# Patient Record
Sex: FEMALE | Race: WHITE | NOT HISPANIC OR LATINO | Employment: PART TIME | URBAN - METROPOLITAN AREA
[De-identification: names, ages, dates, MRNs, and addresses within clinical notes are randomized per-mention and may not be internally consistent; named-entity substitution may affect disease eponyms.]

---
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
## 2019-09-30 ENCOUNTER — TELEPHONE (OUTPATIENT)
Dept: FAMILY MEDICINE CLINIC | Facility: HOSPITAL | Age: 19
End: 2019-09-30

## 2019-11-12 NOTE — TELEPHONE ENCOUNTER
PATIENT MOTHER CAME IN AND STATED THAT DAUGHTER HAS MOVED OUT OF STATE, BUT WILL BE MOVING BACK TO THE AREA COME THE NEW YEAR - PLEASE KEEP US AS PCP AND PATIENT WILL CONTACT US TO SCHEDULE ONCE SHE IS BACK

## 2020-01-17 ENCOUNTER — OFFICE VISIT (OUTPATIENT)
Dept: FAMILY MEDICINE CLINIC | Facility: HOSPITAL | Age: 20
End: 2020-01-17
Payer: OTHER GOVERNMENT

## 2020-01-17 VITALS
TEMPERATURE: 97.5 F | DIASTOLIC BLOOD PRESSURE: 72 MMHG | BODY MASS INDEX: 27.76 KG/M2 | HEIGHT: 65 IN | HEART RATE: 75 BPM | SYSTOLIC BLOOD PRESSURE: 100 MMHG | OXYGEN SATURATION: 99 % | WEIGHT: 166.6 LBS

## 2020-01-17 DIAGNOSIS — Z00.00 ANNUAL PHYSICAL EXAM: Primary | ICD-10-CM

## 2020-01-17 DIAGNOSIS — Z13.220 SCREENING CHOLESTEROL LEVEL: ICD-10-CM

## 2020-01-17 DIAGNOSIS — Z23 ENCOUNTER FOR IMMUNIZATION: ICD-10-CM

## 2020-01-17 DIAGNOSIS — Z11.4 SCREENING FOR HIV (HUMAN IMMUNODEFICIENCY VIRUS): ICD-10-CM

## 2020-01-17 DIAGNOSIS — F41.8 DEPRESSION WITH ANXIETY: ICD-10-CM

## 2020-01-17 PROCEDURE — 90472 IMMUNIZATION ADMIN EACH ADD: CPT

## 2020-01-17 PROCEDURE — 99395 PREV VISIT EST AGE 18-39: CPT | Performed by: NURSE PRACTITIONER

## 2020-01-17 PROCEDURE — 90715 TDAP VACCINE 7 YRS/> IM: CPT

## 2020-01-17 PROCEDURE — 90682 RIV4 VACC RECOMBINANT DNA IM: CPT

## 2020-01-17 PROCEDURE — 90471 IMMUNIZATION ADMIN: CPT

## 2020-01-17 RX ORDER — BUPROPION HYDROCHLORIDE 75 MG/1
75 TABLET ORAL DAILY
COMMUNITY
End: 2020-01-17 | Stop reason: SDUPTHER

## 2020-01-17 RX ORDER — BUPROPION HYDROCHLORIDE 100 MG/1
100 TABLET ORAL DAILY
Qty: 30 TABLET | Refills: 2 | Status: SHIPPED | OUTPATIENT
Start: 2020-01-17 | End: 2020-04-21 | Stop reason: SDUPTHER

## 2020-01-17 NOTE — ASSESSMENT & PLAN NOTE
MP score elevated at 12 w/PHQ score of 9  Wellbutrin increased from 75 to 100mg daily per pt request  Continue lexapro 15mg daily and propranolol prn panic attacks  Return in 2-3 months for next med check  Call sooner w/any acute mood concerns

## 2020-01-17 NOTE — PATIENT INSTRUCTIONS
Wellness Visit for Adults   AMBULATORY CARE:   A wellness visit  is when you see your healthcare provider to get screened for health problems  You can also get advice on how to stay healthy  Write down your questions so you remember to ask them  Ask your healthcare provider how often you should have a wellness visit  What happens at a wellness visit:  Your healthcare provider will ask about your health, and your family history of health problems  This includes high blood pressure, heart disease, and cancer  He or she will ask if you have symptoms that concern you, if you smoke, and about your mood  You may also be asked about your intake of medicines, supplements, food, and alcohol  Any of the following may be done:  · Your weight  will be checked  Your height may also be checked so your body mass index (BMI) can be calculated  Your BMI shows if you are at a healthy weight  · Your blood pressure  and heart rate will be checked  Your temperature may also be checked  · Blood and urine tests  may be done  Blood tests may be done to check your cholesterol levels  Abnormal cholesterol levels increase your risk for heart disease and stroke  You may also need a blood or urine test to check for diabetes if you are at increased risk  Urine tests may be done to look for signs of an infection or kidney disease  · A physical exam  includes checking your heartbeat and lungs with a stethoscope  Your healthcare provider may also check your skin to look for sun damage  · Screening tests  may be recommended  A screening test is done to check for diseases that may not cause symptoms  The screening tests you may need depend on your age, gender, family history, and lifestyle habits  For example, colorectal screening may be recommended if you are 48years old or older  Screening tests you need if you are a woman:   · A Pap smear  is used to screen for cervical cancer   Pap smears are usually done every 3 to 5 years depending on your age  You may need them more often if you have had abnormal Pap smear test results in the past  Ask your healthcare provider how often you should have a Pap smear  · A mammogram  is an x-ray of your breasts to screen for breast cancer  Experts recommend mammograms every 2 years starting at age 48 years  You may need a mammogram at age 52 years or younger if you have an increased risk for breast cancer  Talk to your healthcare provider about when you should start having mammograms and how often you need them  Vaccines you may need:   · Get an influenza vaccine  every year  The influenza vaccine protects you from the flu  Several types of viruses cause the flu  The viruses change over time, so new vaccines are made each year  · Get a tetanus-diphtheria (Td) booster vaccine  every 10 years  This vaccine protects you against tetanus and diphtheria  Tetanus is a severe infection that may cause painful muscle spasms and lockjaw  Diphtheria is a severe bacterial infection that causes a thick covering in the back of your mouth and throat  · Get a human papillomavirus (HPV) vaccine  if you are female and aged 23 to 32 or male 23 to 24 and never received it  This vaccine protects you from HPV infection  HPV is the most common infection spread by sexual contact  HPV may also cause vaginal, penile, and anal cancers  · Get a pneumococcal vaccine  if you are aged 72 years or older  The pneumococcal vaccine is an injection given to protect you from pneumococcal disease  Pneumococcal disease is an infection caused by pneumococcal bacteria  The infection may cause pneumonia, meningitis, or an ear infection  · Get a shingles vaccine  if you are aged 61 or older, even if you have had shingles before  The shingles vaccine is an injection to protect you from the varicella-zoster virus  This is the same virus that causes chickenpox   Shingles is a painful rash that develops in people who had chickenpox or have been exposed to the virus  How to eat healthy:  My Plate is a model for planning healthy meals  It shows the types and amounts of foods that should go on your plate  Fruits and vegetables make up about half of your plate, and grains and protein make up the other half  A serving of dairy is included on the side of your plate  The amount of calories and serving sizes you need depends on your age, gender, weight, and height  Examples of healthy foods are listed below:  · Eat a variety of vegetables  such as dark green, red, and orange vegetables  You can also include canned vegetables low in sodium (salt) and frozen vegetables without added butter or sauces  · Eat a variety of fresh fruits , canned fruit in 100% juice, frozen fruit, and dried fruit  · Include whole grains  At least half of the grains you eat should be whole grains  Examples include whole-wheat bread, wheat pasta, brown rice, and whole-grain cereals such as oatmeal     · Eat a variety of protein foods such as seafood (fish and shellfish), lean meat, and poultry without skin (turkey and chicken)  Examples of lean meats include pork leg, shoulder, or tenderloin, and beef round, sirloin, tenderloin, and extra lean ground beef  Other protein foods include eggs and egg substitutes, beans, peas, soy products, nuts, and seeds  · Choose low-fat dairy products such as skim or 1% milk or low-fat yogurt, cheese, and cottage cheese  · Limit unhealthy fats  such as butter, hard margarine, and shortening  Exercise:  Exercise at least 30 minutes per day on most days of the week  Some examples of exercise include walking, biking, dancing, and swimming  You can also fit in more physical activity by taking the stairs instead of the elevator or parking farther away from stores  Include muscle strengthening activities 2 days each week  Regular exercise provides many health benefits   It helps you manage your weight, and decreases your risk for type 2 diabetes, heart disease, stroke, and high blood pressure  Exercise can also help improve your mood  Ask your healthcare provider about the best exercise plan for you  General health and safety guidelines:   · Do not smoke  Nicotine and other chemicals in cigarettes and cigars can cause lung damage  Ask your healthcare provider for information if you currently smoke and need help to quit  E-cigarettes or smokeless tobacco still contain nicotine  Talk to your healthcare provider before you use these products  · Limit alcohol  A drink of alcohol is 12 ounces of beer, 5 ounces of wine, or 1½ ounces of liquor  · Lose weight, if needed  Being overweight increases your risk of certain health conditions  These include heart disease, high blood pressure, type 2 diabetes, and certain types of cancer  · Protect your skin  Do not sunbathe or use tanning beds  Use sunscreen with a SPF 15 or higher  Apply sunscreen at least 15 minutes before you go outside  Reapply sunscreen every 2 hours  Wear protective clothing, hats, and sunglasses when you are outside  · Drive safely  Always wear your seatbelt  Make sure everyone in your car wears a seatbelt  A seatbelt can save your life if you are in an accident  Do not use your cell phone when you are driving  This could distract you and cause an accident  Pull over if you need to make a call or send a text message  · Practice safe sex  Use latex condoms if are sexually active and have more than one partner  Your healthcare provider may recommend screening tests for sexually transmitted infections (STIs)  · Wear helmets, lifejackets, and protective gear  Always wear a helmet when you ride a bike or motorcycle, go skiing, or play sports that could cause a head injury  Wear protective equipment when you play sports  Wear a lifejacket when you are on a boat or doing water sports    © 2017 [employer] Information is for End User's use only and may not be sold, redistributed or otherwise used for commercial purposes  All illustrations and images included in CareNotes® are the copyrighted property of [employer]  or [employer]  The above information is an  only  It is not intended as medical advice for individual conditions or treatments  Talk to your doctor, nurse or pharmacist before following any medical regimen to see if it is safe and effective for you  Weight Management   AMBULATORY CARE:   Why it is important to manage your weight:  Being overweight increases your risk of health conditions such as heart disease, high blood pressure, type 2 diabetes, and certain types of cancer  It can also increase your risk for osteoarthritis, sleep apnea, and other respiratory problems  Aim for a slow, steady weight loss  Even a small amount of weight loss can lower your risk of health problems  How to lose weight safely:  A safe and healthy way to lose weight is to eat fewer calories and get regular exercise  You can lose up about 1 pound a week by decreasing the number of calories you eat by 500 calories each day  You can decrease calories by eating smaller portion sizes or by cutting out high-calorie foods  Read labels to find out how many calories are in the foods you eat  You can also burn calories with exercise such as walking, swimming, or biking  You will be more likely to keep weight off if you make these changes part of your lifestyle  Healthy meal plan for weight management:  A healthy meal plan includes a variety of foods, contains fewer calories, and helps you stay healthy  A healthy meal plan includes the following:  · Eat whole-grain foods more often  A healthy meal plan should contain fiber  Fiber is the part of grains, fruits, and vegetables that is not broken down by your body  Whole-grain foods are healthy and provide extra fiber in your diet   Some examples of whole-grain foods are whole-wheat breads and pastas, oatmeal, brown rice, and bulgur  · Eat a variety of vegetables every day  Include dark, leafy greens such as spinach, kale, lesia greens, and mustard greens  Eat yellow and orange vegetables such as carrots, sweet potatoes, and winter squash  · Eat a variety of fruits every day  Choose fresh or canned fruit (canned in its own juice or light syrup) instead of juice  Fruit juice has very little or no fiber  · Eat low-fat dairy foods  Drink fat-free (skim) milk or 1% milk  Eat fat-free yogurt and low-fat cottage cheese  Try low-fat cheeses such as mozzarella and other reduced-fat cheeses  · Choose meat and other protein foods that are low in fat  Choose beans or other legumes such as split peas or lentils  Choose fish, skinless poultry (chicken or turkey), or lean cuts of red meat (beef or pork)  Before you cook meat or poultry, cut off any visible fat  · Use less fat and oil  Try baking foods instead of frying them  Add less fat, such as margarine, sour cream, regular salad dressing and mayonnaise to foods  Eat fewer high-fat foods  Some examples of high-fat foods include french fries, doughnuts, ice cream, and cakes  · Eat fewer sweets  Limit foods and drinks that are high in sugar  This includes candy, cookies, regular soda, and sweetened drinks  Ways to decrease calories:   · Eat smaller portions  ¨ Use a small plate with smaller servings  ¨ Do not eat second helpings  ¨ When you eat at a restaurant, ask for a box and place half of your meal in the box before you eat  ¨ Share an entrée with someone else  · Replace high-calorie snacks with healthy, low-calorie snacks  ¨ Choose fresh fruit, vegetables, fat-free rice cakes, or air-popped popcorn instead of potato chips, nuts, or chocolate  ¨ Choose water or calorie-free drinks instead of soda or sweetened drinks  · Eat regular meals  Skipping meals can lead to overeating later in the day   Eat a healthy snack in place of a meal if you do not have time to eat a regular meal      · Do not shop for groceries when you are hungry  You may be more likely to make unhealthy food choices  Take a grocery list of healthy foods and shop after you have eaten  Exercise:  Exercise at least 30 minutes per day on most days of the week  Some examples of exercise include walking, biking, dancing, and swimming  You can also fit in more physical activity by taking the stairs instead of the elevator or parking farther away from stores  Ask your healthcare provider about the best exercise plan for you  Other things to consider as you try to lose weight:   · Be aware of situations that may give you the urge to overeat, such as eating while watching television  Find ways to avoid these situations  For example, read a book, go for a walk, or do crafts  · Meet with a weight loss support group or friends who are also trying to lose weight  This may help you stay motivated to continue working on your weight loss goals  © 2017 [employer] Information is for End User's use only and may not be sold, redistributed or otherwise used for commercial purposes  All illustrations and images included in CareNotes® are the copyrighted property of [employer]  or [employer]  The above information is an  only  It is not intended as medical advice for individual conditions or treatments  Talk to your doctor, nurse or pharmacist before following any medical regimen to see if it is safe and effective for you

## 2020-01-17 NOTE — PROGRESS NOTES
Lexus Wayne MD    NAME: Toyin Petty  AGE: 20 y o  SEX: female  : 2000     DATE: 2020     Assessment and Plan:     Problem List Items Addressed This Visit        Other    Depression with anxiety     MP score elevated at 12 w/PHQ score of 9  Wellbutrin increased from 75 to 100mg daily per pt request  Continue lexapro 15mg daily and propranolol prn panic attacks  Return in 2-3 months for next med check  Call sooner w/any acute mood concerns  Relevant Medications    buPROPion (WELLBUTRIN) 100 mg tablet    Other Relevant Orders    CBC and differential    Comprehensive metabolic panel    TSH, 3rd generation    T4, free    Vitamin D 25 hydroxy    BMI 28 0-28 9,adult     Weight loss encouraged w/diet and exercise efforts           Other Visit Diagnoses     Annual physical exam    -  Primary    PE updated and  form completed; flu and adacel given    Encounter for immunization        Relevant Orders    TDAP VACCINE GREATER THAN OR EQUAL TO 6YO IM (Completed)    influenza vaccine, 9606-2561, quadrivalent, recombinant, PF, 0 5 mL, for patients 18 yr+ (FLUBLOK) (Completed)    Screening for HIV (human immunodeficiency virus)        pt agreeable to screening    Relevant Orders    [facility] HIV 1/2 AG-AB combo    Screening cholesterol level        Relevant Orders    Lipid panel          Immunizations and preventive care screenings were discussed with patient today  Appropriate education was printed on patient's after visit summary  Counseling:  Alcohol/drug use: discussed moderation in alcohol intake, the recommendations for healthy alcohol use, and avoidance of illicit drug use  Dental Health: discussed importance of regular tooth brushing, flossing, and dental visits    Sexual health: discussed sexually transmitted diseases, partner selection, use of condoms, avoidance of unintended pregnancy, and contraceptive alternatives  · Exercise: the importance of regular exercise/physical activity was discussed  Recommend exercise 3-5 times per week for at least 30 minutes  BMI Counseling: Body mass index is 28 16 kg/m²  The BMI is above normal  Nutrition recommendations include encouraging healthy choices of fruits and vegetables, decreasing fast food intake, limiting drinks that contain sugar, moderation in carbohydrate intake and reducing intake of cholesterol  Exercise recommendations include exercising 3-5 times per week and obtaining a gym membership  No pharmacotherapy was ordered  Return in about 3 months (around 2020) for Next scheduled follow up  Chief Complaint:     Chief Complaint   Patient presents with    Well Child      History of Present Illness:     Adult Annual Physical   Patient here for a comprehensive physical exam  The patient reports problems - insurance changed so she now can't see PF for rx's  Was seeing them for depression and anxiety  Doing well w/o recent need for med changes  Interested in increasing wellbutrin  Gets anxious during the day  Takes propranolol as needed for panic attacks  Took last yesterday before work  Diet and Physical Activity  · Diet/Nutrition: "trying to eat healthier but it's hard"  · Exercise: no formal exercise          Depression Screening  PHQ-9 Depression Screening    PHQ-9:    Frequency of the following problems over the past two weeks:       Little interest or pleasure in doing things:  1 - several days  Feeling down, depressed, or hopeless:  0 - not at all  Trouble falling or staying asleep, or sleeping too much:  0 - not at all  Feeling tired or having little energy:  0 - not at all  Poor appetite or overeatin - several days  Feeling bad about yourself - or that you are a failure or have let yourself or your family down:  1 - several days  Trouble concentrating on things, such as reading the newspaper or watching television:  0 - not at all  Moving or speaking so slowly that other people could have noticed  Or the opposite - being so fidgety or restless that you have been moving around a lot more than usual:  1 - several days  Thoughts that you would be better off dead, or of hurting yourself in some way:  0 - not at all  PHQ-2 Score:  1  PHQ-9 Score:  4       General Health  · Sleep: sleeps well  · Hearing: normal - bilateral   · Vision: goes for regular eye exams and wears glasses  · Dental: no dental visits for >1 year  /GYN Health  · Last menstrual period: 12/2019  · Contraceptive method: oral contraceptives  · History of STDs?: no      Review of Systems:     Review of Systems   Constitutional: Negative for activity change, appetite change, fatigue and unexpected weight change  HENT: Negative for dental problem, ear pain, hearing loss, sore throat and trouble swallowing  Eyes: Negative for visual disturbance  Respiratory: Negative for cough and shortness of breath  Cardiovascular: Negative for chest pain, palpitations and leg swelling  Gastrointestinal: Negative for abdominal pain, constipation and diarrhea  Genitourinary: Negative for dysuria  Psychiatric/Behavioral: Positive for dysphoric mood  The patient is nervous/anxious         Past Medical History:     Past Medical History:   Diagnosis Date    Depression     Dysuria     LAST ASSESSED [id number]    Migraine     LAST ASSESSED [id number]    Miscarriage 06/2019    Ovarian cyst       Past Surgical History:     Past Surgical History:   Procedure Laterality Date    DILATION AND CURETTAGE OF UTERUS  06/2019      Social History:     Social History     Socioeconomic History    Marital status: Single     Spouse name: None    Number of children: None    Years of education: 12    Highest education level: None   Occupational History    None   Social Needs    Financial resource strain: None    Food insecurity:     Worry: None     Inability: None    Transportation needs:     Medical: None     Non-medical: None   Tobacco Use    Smoking status: Never Smoker    Smokeless tobacco: Never Used   Substance and Sexual Activity    Alcohol use: No    Drug use: No    Sexual activity: None   Lifestyle    Physical activity:     Days per week: None     Minutes per session: None    Stress: None   Relationships    Social connections:     Talks on phone: None     Gets together: None     Attends Rastafari service: None     Active member of club or organization: None     Attends meetings of clubs or organizations: None     Relationship status: None    Intimate partner violence:     Fear of current or ex partner: None     Emotionally abused: None     Physically abused: None     Forced sexual activity: None   Other Topics Concern    None   Social History Narrative    None      Family History:     Family History   Problem Relation Age of Onset    Irritable bowel syndrome Mother     Iron deficiency Mother         IRON DEFICIENCY ANEMIA LOWEST 5 8     Diabetes Maternal Grandfather       Current Medications:     Current Outpatient Medications   Medication Sig Dispense Refill    buPROPion (WELLBUTRIN) 100 mg tablet Take 1 tablet (100 mg total) by mouth daily 30 tablet 2    escitalopram (LEXAPRO) 10 mg tablet       escitalopram (LEXAPRO) 5 mg tablet       norgestimate-ethinyl estradiol (ORTHO TRI-CYCLEN,TRINESSA) 0 18/0 215/0 25 MG-35 MCG per tablet       propranolol (INDERAL) 10 mg tablet Take by mouth       No current facility-administered medications for this visit  Allergies:     No Known Allergies   Physical Exam:     /72 (Patient Position: Sitting, Cuff Size: Standard)   Pulse 75   Temp 97 5 °F (36 4 °C) (Tympanic)   Ht 5' 4 5" (1 638 m)   Wt 75 6 kg (166 lb 9 6 oz)   LMP 12/20/2019   SpO2 99%   BMI 28 16 kg/m²     Physical Exam   Constitutional: She is oriented to person, place, and time  She appears well-developed and well-nourished   No distress  HENT:   Head: Normocephalic and atraumatic  Right Ear: Hearing and tympanic membrane normal    Left Ear: Hearing and tympanic membrane normal    Mouth/Throat: Oropharynx is clear and moist    Eyes: Conjunctivae are normal  No scleral icterus  Neck: Normal range of motion  Neck supple  No thyromegaly present  Cardiovascular: Normal rate, regular rhythm and normal heart sounds  No murmur heard  Pulmonary/Chest: Effort normal and breath sounds normal  No respiratory distress  Abdominal: Soft  Bowel sounds are normal  There is no hepatosplenomegaly  There is no tenderness  Musculoskeletal: She exhibits no edema  Lymphadenopathy:     She has no cervical adenopathy  Neurological: She is alert and oriented to person, place, and time  No cranial nerve deficit  Skin: Skin is warm and dry  No rash noted  Psychiatric: She has a normal mood and affect  Her behavior is normal  Judgment and thought content normal    Vitals reviewed  Vidal Britt MD BMI Counseling: Body mass index is 28 16 kg/m²  The BMI is above normal  Nutrition recommendations include 3-5 servings of fruits/vegetables daily, reducing fast food intake, consuming healthier snacks, decreasing soda and/or juice intake, moderation in carbohydrate intake and reducing intake of cholesterol  Exercise recommendations include exercising 3-5 times per week and joining a gym

## 2020-01-30 NOTE — TELEPHONE ENCOUNTER
01/30/20 9:06 AM     Thank you for your request  Your request has been received, reviewed, and noted that it no longer requires attention  Message notes to keep PCP as listed  This message will now be completed      Thank you  Sushil Ask

## 2020-02-04 DIAGNOSIS — F41.8 DEPRESSION WITH ANXIETY: Primary | ICD-10-CM

## 2020-02-06 ENCOUNTER — TELEPHONE (OUTPATIENT)
Dept: FAMILY MEDICINE CLINIC | Facility: HOSPITAL | Age: 20
End: 2020-02-06

## 2020-02-07 DIAGNOSIS — F41.0 PANIC ATTACKS: Primary | ICD-10-CM

## 2020-02-07 RX ORDER — ESCITALOPRAM OXALATE 10 MG/1
10 TABLET ORAL DAILY
Qty: 30 TABLET | Refills: 2 | Status: SHIPPED | OUTPATIENT
Start: 2020-02-07 | End: 2020-04-24 | Stop reason: SDUPTHER

## 2020-02-07 RX ORDER — ESCITALOPRAM OXALATE 5 MG/1
5 TABLET ORAL DAILY
Qty: 30 TABLET | Refills: 2 | Status: SHIPPED | OUTPATIENT
Start: 2020-02-07 | End: 2020-04-24 | Stop reason: SDUPTHER

## 2020-02-07 RX ORDER — PROPRANOLOL HYDROCHLORIDE 10 MG/1
TABLET ORAL
Qty: 30 TABLET | Refills: 0 | Status: SHIPPED | OUTPATIENT
Start: 2020-02-07 | End: 2020-04-24 | Stop reason: SDUPTHER

## 2020-02-07 NOTE — TELEPHONE ENCOUNTER
Lexapro refilled  Please call [facility] pharmacy to see how propranolol has been previously rx'd and send me order to authorize

## 2020-04-16 ENCOUNTER — TELEPHONE (OUTPATIENT)
Dept: FAMILY MEDICINE CLINIC | Facility: HOSPITAL | Age: 20
End: 2020-04-16

## 2020-04-21 ENCOUNTER — TELEMEDICINE (OUTPATIENT)
Dept: FAMILY MEDICINE CLINIC | Facility: HOSPITAL | Age: 20
End: 2020-04-21
Payer: OTHER GOVERNMENT

## 2020-04-21 ENCOUNTER — TELEPHONE (OUTPATIENT)
Dept: BEHAVIORAL/MENTAL HEALTH CLINIC | Facility: CLINIC | Age: 20
End: 2020-04-21

## 2020-04-21 VITALS — BODY MASS INDEX: 25.66 KG/M2 | TEMPERATURE: 98.3 F | HEIGHT: 65 IN | WEIGHT: 154 LBS

## 2020-04-21 DIAGNOSIS — F41.8 DEPRESSION WITH ANXIETY: Primary | ICD-10-CM

## 2020-04-21 PROCEDURE — 99213 OFFICE O/P EST LOW 20 MIN: CPT | Performed by: NURSE PRACTITIONER

## 2020-04-21 RX ORDER — BUPROPION HYDROCHLORIDE 100 MG/1
TABLET ORAL
Qty: 45 TABLET | Refills: 2 | Status: SHIPPED | OUTPATIENT
Start: 2020-04-21 | End: 2020-07-15 | Stop reason: SDUPTHER

## 2020-04-23 ENCOUNTER — TELEMEDICINE (OUTPATIENT)
Dept: BEHAVIORAL/MENTAL HEALTH CLINIC | Facility: CLINIC | Age: 20
End: 2020-04-23
Payer: OTHER GOVERNMENT

## 2020-04-23 DIAGNOSIS — F43.23 ADJUSTMENT DISORDER WITH MIXED ANXIETY AND DEPRESSED MOOD: Primary | ICD-10-CM

## 2020-04-23 PROCEDURE — 90791 PSYCH DIAGNOSTIC EVALUATION: CPT | Performed by: SOCIAL WORKER

## 2020-04-24 DIAGNOSIS — F41.8 DEPRESSION WITH ANXIETY: ICD-10-CM

## 2020-04-24 DIAGNOSIS — F41.0 PANIC ATTACKS: ICD-10-CM

## 2020-04-24 RX ORDER — ESCITALOPRAM OXALATE 10 MG/1
10 TABLET ORAL DAILY
Qty: 30 TABLET | Refills: 0 | Status: SHIPPED | OUTPATIENT
Start: 2020-04-24 | End: 2020-05-19

## 2020-04-24 RX ORDER — PROPRANOLOL HYDROCHLORIDE 10 MG/1
TABLET ORAL
Qty: 30 TABLET | Refills: 0 | Status: SHIPPED | OUTPATIENT
Start: 2020-04-24 | End: 2020-07-24 | Stop reason: ALTCHOICE

## 2020-04-24 RX ORDER — ESCITALOPRAM OXALATE 5 MG/1
5 TABLET ORAL DAILY
Qty: 30 TABLET | Refills: 0 | Status: SHIPPED | OUTPATIENT
Start: 2020-04-24 | End: 2020-05-19

## 2020-05-05 ENCOUNTER — TELEPHONE (OUTPATIENT)
Dept: FAMILY MEDICINE CLINIC | Facility: HOSPITAL | Age: 20
End: 2020-05-05

## 2020-05-15 ENCOUNTER — TELEPHONE (OUTPATIENT)
Dept: BEHAVIORAL/MENTAL HEALTH CLINIC | Facility: CLINIC | Age: 20
End: 2020-05-15

## 2020-05-19 ENCOUNTER — DOCUMENTATION (OUTPATIENT)
Dept: BEHAVIORAL/MENTAL HEALTH CLINIC | Facility: CLINIC | Age: 20
End: 2020-05-19

## 2020-05-19 ENCOUNTER — TELEMEDICINE (OUTPATIENT)
Dept: FAMILY MEDICINE CLINIC | Facility: HOSPITAL | Age: 20
End: 2020-05-19
Payer: OTHER GOVERNMENT

## 2020-05-19 DIAGNOSIS — F41.8 DEPRESSION WITH ANXIETY: ICD-10-CM

## 2020-05-19 PROCEDURE — 99213 OFFICE O/P EST LOW 20 MIN: CPT | Performed by: NURSE PRACTITIONER

## 2020-05-19 RX ORDER — ESCITALOPRAM OXALATE 20 MG/1
20 TABLET ORAL DAILY
Qty: 30 TABLET | Refills: 1 | Status: SHIPPED | OUTPATIENT
Start: 2020-05-19 | End: 2020-07-15 | Stop reason: SDUPTHER

## 2020-05-26 ENCOUNTER — TELEMEDICINE (OUTPATIENT)
Dept: FAMILY MEDICINE CLINIC | Facility: HOSPITAL | Age: 20
End: 2020-05-26
Payer: OTHER GOVERNMENT

## 2020-05-26 VITALS — TEMPERATURE: 98.5 F | HEIGHT: 64 IN | WEIGHT: 154 LBS | BODY MASS INDEX: 26.29 KG/M2

## 2020-05-26 DIAGNOSIS — R30.0 DYSURIA: Primary | ICD-10-CM

## 2020-05-26 LAB
SL AMB  POCT GLUCOSE, UA: ABNORMAL
SL AMB LEUKOCYTE ESTERASE,UA: ABNORMAL
SL AMB POCT BILIRUBIN,UA: ABNORMAL
SL AMB POCT BLOOD,UA: ABNORMAL
SL AMB POCT CLARITY,UA: ABNORMAL
SL AMB POCT COLOR,UA: YELLOW
SL AMB POCT KETONES,UA: ABNORMAL
SL AMB POCT NITRITE,UA: ABNORMAL
SL AMB POCT PH,UA: 6
SL AMB POCT SPECIFIC GRAVITY,UA: 1
SL AMB POCT URINE PROTEIN: ABNORMAL
SL AMB POCT UROBILINOGEN: ABNORMAL

## 2020-05-26 PROCEDURE — 99213 OFFICE O/P EST LOW 20 MIN: CPT | Performed by: NURSE PRACTITIONER

## 2020-05-26 PROCEDURE — 87086 URINE CULTURE/COLONY COUNT: CPT | Performed by: NURSE PRACTITIONER

## 2020-05-26 PROCEDURE — 81002 URINALYSIS NONAUTO W/O SCOPE: CPT | Performed by: NURSE PRACTITIONER

## 2020-05-26 RX ORDER — CEPHALEXIN 500 MG/1
500 CAPSULE ORAL EVERY 8 HOURS SCHEDULED
Qty: 21 CAPSULE | Refills: 0 | Status: SHIPPED | OUTPATIENT
Start: 2020-05-26 | End: 2020-06-02

## 2020-05-27 LAB — BACTERIA UR CULT: NORMAL

## 2020-07-15 DIAGNOSIS — F41.8 DEPRESSION WITH ANXIETY: ICD-10-CM

## 2020-07-15 RX ORDER — ESCITALOPRAM OXALATE 20 MG/1
20 TABLET ORAL DAILY
Qty: 30 TABLET | Refills: 1 | Status: SHIPPED | OUTPATIENT
Start: 2020-07-15 | End: 2020-09-18 | Stop reason: SDUPTHER

## 2020-07-15 RX ORDER — BUPROPION HYDROCHLORIDE 100 MG/1
TABLET ORAL
Qty: 45 TABLET | Refills: 2 | Status: SHIPPED | OUTPATIENT
Start: 2020-07-15 | End: 2020-09-18 | Stop reason: SDUPTHER

## 2020-07-24 ENCOUNTER — TELEMEDICINE (OUTPATIENT)
Dept: FAMILY MEDICINE CLINIC | Facility: HOSPITAL | Age: 20
End: 2020-07-24
Payer: COMMERCIAL

## 2020-07-24 VITALS — HEIGHT: 64 IN | TEMPERATURE: 97.5 F | WEIGHT: 154 LBS | BODY MASS INDEX: 26.29 KG/M2

## 2020-07-24 DIAGNOSIS — F41.8 DEPRESSION WITH ANXIETY: Primary | ICD-10-CM

## 2020-07-24 PROCEDURE — 99213 OFFICE O/P EST LOW 20 MIN: CPT | Performed by: NURSE PRACTITIONER

## 2020-07-24 PROCEDURE — 3008F BODY MASS INDEX DOCD: CPT | Performed by: NURSE PRACTITIONER

## 2020-07-24 NOTE — ASSESSMENT & PLAN NOTE
PHQ and MP scores remain elevated at 12 on daily lexapro and wellbutrin  Pt denies SI/HI  She wishes to continue same doses of meds  She understands importance of therapy but cannot accommodate fitting into work/sleep schedule currently  Offered to help arrange appt w/Arminda should she have more time to pursue  Discussed benefit of and she plans to work on lifestyle (decreasing work hours, increasing sleep time, exercising) measures to ease sleep deprivation, anxiety and depression  F/U in 6-8 weeks for next med check and call sooner w/any acute mood concerns/changes

## 2020-07-24 NOTE — PROGRESS NOTES
Virtual Regular Visit      Assessment/Plan:    Problem List Items Addressed This Visit        Other    Depression with anxiety - Primary     PHQ and MP scores remain elevated at 12 on daily lexapro and wellbutrin  Pt denies SI/HI  She wishes to continue same doses of meds  She understands importance of therapy but cannot accommodate fitting into work/sleep schedule currently  Offered to help arrange appt w/Arminda should she have more time to pursue  Discussed benefit of and she plans to work on lifestyle (decreasing work hours, increasing sleep time, exercising) measures to ease sleep deprivation, anxiety and depression  F/U in 6-8 weeks for next med check and call sooner w/any acute mood concerns/changes  Reason for visit is   Chief Complaint   Patient presents with    Follow-up     6 week    Virtual Regular Visit        Encounter provider MIRIAM Clayton    Provider located at [address] [place] [address] MD  [address] [place] [address]      Recent Visits  No visits were found meeting these conditions  Showing recent visits within past 7 days and meeting all other requirements     Today's Visits  Date Type Provider Dept   07/24/20 Telemedicine MIRIAM Clayton  [facility]   Showing today's visits and meeting all other requirements     Future Appointments  No visits were found meeting these conditions  Showing future appointments within next 150 days and meeting all other requirements        The patient was identified by name and date of birth  Brenda Navarro was informed that this is a telemedicine visit and that the visit is being conducted through [address] [place] [address] and patient was informed that this is not a secure, HIPAA-complaint platform  She agrees to proceed     My office door was closed  No one else was in the room  She acknowledged consent and understanding of privacy and security of the video platform   The patient has agreed to participate and understands they can discontinue the visit at any time  Patient is aware this is a billable service  Subjective  Flor Viera is a 23 y o  female due for anxiety/depression follow up  States she has been OK  Still struggling w/anxiety she attributes to work stress  Is working 2 jobs and she only sleeps about 4 hours/day  She and  are moving into apartment and she finds this stressful  Attributes fatigue to mood struggles  Has tried to cut back hours on second job  States she doesn't have time for counseling due to work schedules and need to sleep when not working  Denies need for change in meds currently as she knows she has to improve schedule for more sleep  She denies HI/SI  Past Medical History:   Diagnosis Date    Depression     Dysuria     LAST ASSESSED [id number]    Migraine     LAST ASSESSED [id number]    Miscarriage 06/2019    Ovarian cyst        Past Surgical History:   Procedure Laterality Date    DILATION AND CURETTAGE OF UTERUS  06/2019       Current Outpatient Medications   Medication Sig Dispense Refill    buPROPion (WELLBUTRIN) 100 mg tablet Take 1 5 tablets daily in the morning 45 tablet 2    escitalopram (LEXAPRO) 20 mg tablet Take 1 tablet (20 mg total) by mouth daily 30 tablet 1    norgestimate-ethinyl estradiol (ORTHO TRI-CYCLEN,TRINESSA) 0 18/0 215/0 25 MG-35 MCG per tablet        No current facility-administered medications for this visit  No Known Allergies    Review of Systems   Constitutional: Positive for fatigue (she attributes to lack of sleep)  Psychiatric/Behavioral: Positive for dysphoric mood and sleep disturbance  Negative for self-injury and suicidal ideas  The patient is nervous/anxious  Video Exam    Vitals:    07/24/20 0813   Temp: 97 5 °F (36 4 °C)   Weight: 69 9 kg (154 lb)   Height: 5' 4" (1 626 m)       Physical Exam   Constitutional: She is oriented to person, place, and time   She appears well-developed and well-nourished  No distress  HENT:   Head: Normocephalic and atraumatic  Pulmonary/Chest: Effort normal  No respiratory distress  No cough   Neurological: She is alert and oriented to person, place, and time  Psychiatric: She has a normal mood and affect  Her behavior is normal  Judgment and thought content normal    Relaxed, freely conversive w/good eye contact   Vitals reviewed  MP-7 Flowsheet Screening      Most Recent Value   Over the last two weeks, how often have you been bothered by the following problems? Feeling nervous, anxious, or on edge  2   Not being able to stop or control worrying  2   Worrying too much about different things  2   Trouble relaxing   1   Being so restless that it's hard to sit still  2   Becoming easily annoyed or irritable   2   Feeling afraid as if something awful might happen  1   How difficult have these problems made it for you to do your work, take care of things at home, or get along with other people? Somewhat difficult   MP Score   12          PHQ-9 Depression Screening    PHQ-9:    Frequency of the following problems over the past two weeks:       Little interest or pleasure in doing things:  3 - nearly every day  Feeling down, depressed, or hopeless:  1 - several days  Trouble falling or staying asleep, or sleeping too much:  0 - not at all  Feeling tired or having little energy:  2 - more than half the days  Poor appetite or overeatin - several days  Feeling bad about yourself - or that you are a failure or have let yourself or your family down:  1 - several days  Trouble concentrating on things, such as reading the newspaper or watching television:  1 - several days  Moving or speaking so slowly that other people could have noticed   Or the opposite - being so fidgety or restless that you have been moving around a lot more than usual:  2 - more than half the days  Thoughts that you would be better off dead, or of hurting yourself in some way:  1 - several days  PHQ-2 Score:  4  PHQ-9 Score:  12           I spent 15 minutes with patient today in which greater than 50% of the time was spent in counseling/coordination of care regarding symptoms and plan of care      VIRTUAL VISIT DISCLAIMER    Andreina Raza acknowledges that she has consented to an online visit or consultation  She understands that the online visit is based solely on information provided by her, and that, in the absence of a face-to-face physical evaluation by the physician, the diagnosis she receives is both limited and provisional in terms of accuracy and completeness  This is not intended to replace a full medical face-to-face evaluation by the physician  Andreina Raza understands and accepts these terms

## 2020-08-18 ENCOUNTER — OFFICE VISIT (OUTPATIENT)
Dept: FAMILY MEDICINE CLINIC | Facility: HOSPITAL | Age: 20
End: 2020-08-18
Payer: COMMERCIAL

## 2020-08-18 VITALS
HEART RATE: 87 BPM | DIASTOLIC BLOOD PRESSURE: 74 MMHG | OXYGEN SATURATION: 98 % | WEIGHT: 156.6 LBS | HEIGHT: 64 IN | TEMPERATURE: 98.1 F | BODY MASS INDEX: 26.73 KG/M2 | SYSTOLIC BLOOD PRESSURE: 112 MMHG

## 2020-08-18 DIAGNOSIS — R21 RASH: Primary | ICD-10-CM

## 2020-08-18 PROCEDURE — 99213 OFFICE O/P EST LOW 20 MIN: CPT | Performed by: NURSE PRACTITIONER

## 2020-08-18 PROCEDURE — 3008F BODY MASS INDEX DOCD: CPT | Performed by: NURSE PRACTITIONER

## 2020-08-18 RX ORDER — SULFAMETHOXAZOLE AND TRIMETHOPRIM 800; 160 MG/1; MG/1
1 TABLET ORAL EVERY 12 HOURS SCHEDULED
Qty: 14 TABLET | Refills: 0 | Status: SHIPPED | OUTPATIENT
Start: 2020-08-18 | End: 2020-08-25

## 2020-08-18 NOTE — PROGRESS NOTES
Assessment/Plan:    No problem-specific Assessment & Plan notes found for this encounter  Diagnoses and all orders for this visit:    Rash  Comments:  consider MRSA (no drainage to culture), use sulfa as rx'd & keep covered; call w/worse or persistent sx's  Orders:  -     sulfamethoxazole-trimethoprim (BACTRIM DS) 800-160 mg per tablet; Take 1 tablet by mouth every 12 (twelve) hours for 7 days          Subjective:      Patient ID: Brenda Navarro is a 23 y o  female  Has a bite of some sort on right arm for past week  Has been looking more infected past few days w/feeling warm and has redness  No drainage from  Applying neosporin and bandaid  The following portions of the patient's history were reviewed and updated as appropriate: allergies, current medications, past medical history, past social history, past surgical history and problem list     Review of Systems   Constitutional: Negative for chills and fever  Skin: Positive for rash (right arm)  Objective:      /74 (Patient Position: Sitting, Cuff Size: Standard)   Pulse 87   Temp 98 1 °F (36 7 °C) (Tympanic)   Ht 5' 4" (1 626 m)   Wt 71 kg (156 lb 9 6 oz)   SpO2 98%   BMI 26 88 kg/m²          Physical Exam  Vitals signs reviewed  Constitutional:       General: She is not in acute distress  Appearance: Normal appearance  HENT:      Head: Normocephalic and atraumatic  Pulmonary:      Effort: Pulmonary effort is normal  No respiratory distress  Skin:     General: Skin is warm and dry  Neurological:      General: No focal deficit present  Mental Status: She is alert and oriented to person, place, and time  Psychiatric:         Mood and Affect: Mood normal          Behavior: Behavior normal          Thought Content:  Thought content normal          Judgment: Judgment normal

## 2020-09-18 ENCOUNTER — TELEMEDICINE (OUTPATIENT)
Dept: FAMILY MEDICINE CLINIC | Facility: HOSPITAL | Age: 20
End: 2020-09-18
Payer: COMMERCIAL

## 2020-09-18 VITALS — HEIGHT: 64 IN | TEMPERATURE: 97.5 F | WEIGHT: 158 LBS | BODY MASS INDEX: 26.98 KG/M2

## 2020-09-18 DIAGNOSIS — F41.8 DEPRESSION WITH ANXIETY: Primary | ICD-10-CM

## 2020-09-18 PROCEDURE — 99213 OFFICE O/P EST LOW 20 MIN: CPT | Performed by: NURSE PRACTITIONER

## 2020-09-18 RX ORDER — BUPROPION HYDROCHLORIDE 100 MG/1
TABLET ORAL
Qty: 45 TABLET | Refills: 2 | Status: SHIPPED | OUTPATIENT
Start: 2020-09-18 | End: 2020-12-22 | Stop reason: SDUPTHER

## 2020-09-18 RX ORDER — ESCITALOPRAM OXALATE 20 MG/1
20 TABLET ORAL DAILY
Qty: 30 TABLET | Refills: 2 | Status: SHIPPED | OUTPATIENT
Start: 2020-09-18 | End: 2020-12-21 | Stop reason: SDUPTHER

## 2020-09-18 NOTE — ASSESSMENT & PLAN NOTE
PHQ improved from 12 to 5 w/improved MP score from 12 to 10  Refills issued to continue same daily lexapro and wellbutrin as she desires  Schedule f/u in 3 months for next med check  Call sooner w/any acute mood concerns/changes

## 2020-09-18 NOTE — PROGRESS NOTES
Virtual Regular Visit      Assessment/Plan:    Problem List Items Addressed This Visit        Other    Depression with anxiety - Primary     PHQ improved from 12 to 5 w/improved MP score from 12 to 10  Refills issued to continue same daily lexapro and wellbutrin as she desires  Schedule f/u in 3 months for next med check  Call sooner w/any acute mood concerns/changes  Relevant Medications    escitalopram (LEXAPRO) 20 mg tablet    buPROPion (WELLBUTRIN) 100 mg tablet               Reason for visit is   Chief Complaint   Patient presents with    Follow-up    Virtual Regular Visit        Encounter provider Mor Jasso    Provider located at [facility] MD  [address] [place] [address]      Recent Visits  No visits were found meeting these conditions  Showing recent visits within past 7 days and meeting all other requirements     Today's Visits  Date Type Provider Dept   09/18/20 Telemedicine [facility]IRIAM [facility]   Showing today's visits and meeting all other requirements     Future Appointments  No visits were found meeting these conditions  Showing future appointments within next 150 days and meeting all other requirements        The patient was identified by name and date of birth  Julianne Regan was informed that this is a telemedicine visit and that the visit is being conducted through [address] [place] [address] and patient was informed that this is not a secure, HIPAA-complaint platform  She agrees to proceed     My office door was closed  No one else was in the room  She acknowledged consent and understanding of privacy and security of the video platform  The patient has agreed to participate and understands they can discontinue the visit at any time  Patient is aware this is a billable service  Subjective  Julianne Regan is a 23 y o  female due for anxiety/depression med check  States she's been good   She and  have gotten an apartment which has helped to ease stress and anxiety  Doing well with medications and denies need for change  Hasn't done counseling as she feels her coping mechanisms are better  Past Medical History:   Diagnosis Date    Depression     Dysuria     LAST ASSESSED [id number]    Migraine     LAST ASSESSED [id number]    Miscarriage 06/2019    Ovarian cyst        Past Surgical History:   Procedure Laterality Date    DILATION AND CURETTAGE OF UTERUS  06/2019       Current Outpatient Medications   Medication Sig Dispense Refill    buPROPion (WELLBUTRIN) 100 mg tablet Take 1 5 tablets daily in the morning 45 tablet 2    escitalopram (LEXAPRO) 20 mg tablet Take 1 tablet (20 mg total) by mouth daily 30 tablet 2    norgestimate-ethinyl estradiol (ORTHO TRI-CYCLEN,TRINESSA) 0 18/0 215/0 25 MG-35 MCG per tablet        No current facility-administered medications for this visit  No Known Allergies    Review of Systems   Psychiatric/Behavioral: Positive for dysphoric mood  Negative for self-injury and suicidal ideas  The patient is nervous/anxious  Video Exam    Vitals:    09/18/20 1114   Temp: 97 5 °F (36 4 °C)   TempSrc: Tympanic   Weight: 71 7 kg (158 lb)   Height: 5' 4" (1 626 m)       Physical Exam  Constitutional:       General: She is not in acute distress  Appearance: Normal appearance  HENT:      Head: Normocephalic and atraumatic  Eyes:      General: No scleral icterus  Pulmonary:      Effort: Pulmonary effort is normal  No respiratory distress  Neurological:      General: No focal deficit present  Mental Status: She is alert and oriented to person, place, and time  Psychiatric:         Mood and Affect: Mood normal          Behavior: Behavior normal          Thought Content:  Thought content normal          Judgment: Judgment normal       Comments: Relaxed, freely conversive w/good eye contact          PHQ-9 Depression Screening    PHQ-9:    Frequency of the following problems over the past two weeks:       Little interest or pleasure in doing things:  0 - not at all  Feeling down, depressed, or hopeless:  0 - not at all  Trouble falling or staying asleep, or sleeping too much:  0 - not at all  Feeling tired or having little energy:  2 - more than half the days  Poor appetite or overeatin - more than half the days  Feeling bad about yourself - or that you are a failure or have let yourself or your family down:  1 - several days  Trouble concentrating on things, such as reading the newspaper or watching television:  0 - not at all  Moving or speaking so slowly that other people could have noticed  Or the opposite - being so fidgety or restless that you have been moving around a lot more than usual:  0 - not at all  Thoughts that you would be better off dead, or of hurting yourself in some way:  0 - not at all  PHQ-2 Score:  0  PHQ-9 Score:  5           MP-7 Flowsheet Screening      Most Recent Value   Over the last two weeks, how often have you been bothered by the following problems? Feeling nervous, anxious, or on edge  2   Not being able to stop or control worrying  2   Worrying too much about different things  2   Trouble relaxing   1   Being so restless that it's hard to sit still  1   Becoming easily annoyed or irritable   1   Feeling afraid as if something awful might happen  1   How difficult have these problems made it for you to do your work, take care of things at home, or get along with other people? Somewhat difficult   MP Score   10            I spent 10 minutes with patient today in which greater than 50% of the time was spent in counseling/coordination of care regarding symptoms and plan of cre      VIRTUAL VISIT DISCLAIMER    Senia Uriostegui acknowledges that she has consented to an online visit or consultation   She understands that the online visit is based solely on information provided by her, and that, in the absence of a face-to-face physical evaluation by the physician, the diagnosis she receives is both limited and provisional in terms of accuracy and completeness  This is not intended to replace a full medical face-to-face evaluation by the physician  Renan Bartlett understands and accepts these terms

## 2020-12-14 ENCOUNTER — TELEPHONE (OUTPATIENT)
Dept: OTHER | Facility: OTHER | Age: 20
End: 2020-12-14

## 2020-12-21 ENCOUNTER — TELEMEDICINE (OUTPATIENT)
Dept: FAMILY MEDICINE CLINIC | Facility: HOSPITAL | Age: 20
End: 2020-12-21
Payer: COMMERCIAL

## 2020-12-21 VITALS — HEIGHT: 64 IN | BODY MASS INDEX: 27.66 KG/M2 | WEIGHT: 162 LBS

## 2020-12-21 DIAGNOSIS — F41.8 DEPRESSION WITH ANXIETY: Primary | ICD-10-CM

## 2020-12-21 PROCEDURE — 3725F SCREEN DEPRESSION PERFORMED: CPT | Performed by: NURSE PRACTITIONER

## 2020-12-21 PROCEDURE — 99213 OFFICE O/P EST LOW 20 MIN: CPT | Performed by: NURSE PRACTITIONER

## 2020-12-21 RX ORDER — ESCITALOPRAM OXALATE 10 MG/1
TABLET ORAL
Start: 2020-12-21 | End: 2020-12-22 | Stop reason: SDUPTHER

## 2020-12-22 DIAGNOSIS — F41.8 DEPRESSION WITH ANXIETY: ICD-10-CM

## 2020-12-22 RX ORDER — ESCITALOPRAM OXALATE 10 MG/1
TABLET ORAL
Qty: 30 TABLET | Refills: 1 | OUTPATIENT
Start: 2020-12-22

## 2020-12-22 RX ORDER — ESCITALOPRAM OXALATE 10 MG/1
TABLET ORAL
Start: 2020-12-22 | End: 2021-01-04 | Stop reason: SDUPTHER

## 2020-12-22 RX ORDER — BUPROPION HYDROCHLORIDE 100 MG/1
TABLET ORAL
Qty: 45 TABLET | Refills: 2 | Status: SHIPPED | OUTPATIENT
Start: 2020-12-22 | End: 2021-02-19 | Stop reason: SDUPTHER

## 2020-12-30 ENCOUNTER — TELEMEDICINE (OUTPATIENT)
Dept: FAMILY MEDICINE CLINIC | Facility: HOSPITAL | Age: 20
End: 2020-12-30
Payer: COMMERCIAL

## 2020-12-30 VITALS — WEIGHT: 162 LBS | TEMPERATURE: 99.2 F | BODY MASS INDEX: 27.66 KG/M2 | HEIGHT: 64 IN

## 2020-12-30 DIAGNOSIS — B34.9 VIRAL INFECTION, UNSPECIFIED: Primary | ICD-10-CM

## 2020-12-30 PROCEDURE — 99214 OFFICE O/P EST MOD 30 MIN: CPT | Performed by: NURSE PRACTITIONER

## 2020-12-30 PROCEDURE — 3008F BODY MASS INDEX DOCD: CPT | Performed by: NURSE PRACTITIONER

## 2020-12-31 DIAGNOSIS — B34.9 VIRAL INFECTION, UNSPECIFIED: ICD-10-CM

## 2020-12-31 PROCEDURE — U0003 INFECTIOUS AGENT DETECTION BY NUCLEIC ACID (DNA OR RNA); SEVERE ACUTE RESPIRATORY SYNDROME CORONAVIRUS 2 (SARS-COV-2) (CORONAVIRUS DISEASE [COVID-19]), AMPLIFIED PROBE TECHNIQUE, MAKING USE OF HIGH THROUGHPUT TECHNOLOGIES AS DESCRIBED BY CMS-2020-01-R: HCPCS | Performed by: NURSE PRACTITIONER

## 2021-01-01 LAB — SARS-COV-2 RNA SPEC QL NAA+PROBE: NOT DETECTED

## 2021-01-04 ENCOUNTER — TELEPHONE (OUTPATIENT)
Dept: FAMILY MEDICINE CLINIC | Facility: HOSPITAL | Age: 21
End: 2021-01-04

## 2021-01-04 DIAGNOSIS — F41.8 DEPRESSION WITH ANXIETY: ICD-10-CM

## 2021-01-04 RX ORDER — ESCITALOPRAM OXALATE 20 MG/1
TABLET ORAL
Qty: 30 TABLET | Refills: 2 | Status: SHIPPED | OUTPATIENT
Start: 2021-01-04 | End: 2021-03-19

## 2021-01-04 NOTE — TELEPHONE ENCOUNTER
I spoke with Sathish Pacheco and she is agreeable with going back on the 20mg Lexapro  Please send to [facility]

## 2021-01-04 NOTE — TELEPHONE ENCOUNTER
Please call patient and see if she is agreeable to taking 20mg lexapro as mom suggests  I need patient's approval before changing dose

## 2021-01-04 NOTE — TELEPHONE ENCOUNTER
[address] [facility] never received her Lexapro script  Mother feels she should continue on the 20mg strength  Wants to know if you could send a script for 20mg to [facility]

## 2021-01-22 DIAGNOSIS — Z23 ENCOUNTER FOR IMMUNIZATION: ICD-10-CM

## 2021-02-19 ENCOUNTER — OFFICE VISIT (OUTPATIENT)
Dept: FAMILY MEDICINE CLINIC | Facility: HOSPITAL | Age: 21
End: 2021-02-19
Payer: COMMERCIAL

## 2021-02-19 VITALS
OXYGEN SATURATION: 98 % | SYSTOLIC BLOOD PRESSURE: 102 MMHG | TEMPERATURE: 97.2 F | HEIGHT: 65 IN | BODY MASS INDEX: 30.82 KG/M2 | HEART RATE: 109 BPM | DIASTOLIC BLOOD PRESSURE: 76 MMHG | WEIGHT: 185 LBS

## 2021-02-19 DIAGNOSIS — Z23 ENCOUNTER FOR IMMUNIZATION: ICD-10-CM

## 2021-02-19 DIAGNOSIS — Z00.00 ANNUAL PHYSICAL EXAM: Primary | ICD-10-CM

## 2021-02-19 DIAGNOSIS — F41.8 DEPRESSION WITH ANXIETY: ICD-10-CM

## 2021-02-19 DIAGNOSIS — Z11.4 ENCOUNTER FOR SCREENING FOR HIV: ICD-10-CM

## 2021-02-19 DIAGNOSIS — Z13.220 NEED FOR LIPID SCREENING: ICD-10-CM

## 2021-02-19 PROCEDURE — 3008F BODY MASS INDEX DOCD: CPT | Performed by: NURSE PRACTITIONER

## 2021-02-19 PROCEDURE — 90471 IMMUNIZATION ADMIN: CPT

## 2021-02-19 PROCEDURE — 90686 IIV4 VACC NO PRSV 0.5 ML IM: CPT

## 2021-02-19 PROCEDURE — 3725F SCREEN DEPRESSION PERFORMED: CPT | Performed by: NURSE PRACTITIONER

## 2021-02-19 PROCEDURE — 99395 PREV VISIT EST AGE 18-39: CPT | Performed by: NURSE PRACTITIONER

## 2021-02-19 RX ORDER — BUPROPION HYDROCHLORIDE 100 MG/1
TABLET ORAL
Qty: 45 TABLET | Refills: 2
Start: 2021-02-19 | End: 2021-05-21 | Stop reason: SDUPTHER

## 2021-02-19 NOTE — ASSESSMENT & PLAN NOTE
PHQ score elevated at 13 w/MP score 13 on daily lexapro and wellbutrin  Will dc wellbutrin as pt requests (she attributes to worsening anxiety)  Advise she maintain daily lexapro for a couple weeks before considering start of another medication  Mood may be adequately controlled on just lexapro  Reviewed lifestyle measures to aide mood (adequate sleep, regular exercise, healthier diet) and urge she incorporate  Discussed benefit of resuming counseling as was previously beneficial   Return in 3 months for next med check  Call sooner w/any acute mood concerns/changes

## 2021-02-19 NOTE — PROGRESS NOTES
Suzan Joiner MD    NAME: Lary Hernadez  AGE: [place] y o  SEX: female  : 2000     DATE: 2021     Assessment and Plan:     Problem List Items Addressed This Visit        Other    Depression with anxiety     PHQ score elevated at 13 w/MP score 13 on daily lexapro and wellbutrin  Will dc wellbutrin as pt requests (she attributes to worsening anxiety)  Advise she maintain daily lexapro for a couple weeks before considering start of another medication  Mood may be adequately controlled on just lexapro  Reviewed lifestyle measures to aide mood (adequate sleep, regular exercise, healthier diet) and urge she incorporate  Discussed benefit of resuming counseling as was previously beneficial   Return in 3 months for next med check  Call sooner w/any acute mood concerns/changes  Relevant Medications    buPROPion (WELLBUTRIN) 100 mg tablet    Other Relevant Orders    CBC and differential    Comprehensive metabolic panel    TSH, 3rd generation    T4, free    Vitamin D 25 hydroxy    Vitamin B12    BMI 31 0-31 9,adult      Other Visit Diagnoses     Annual physical exam    -  Primary    PE updated and flu shot given; orders given to update fasting labs and will call w/results    Encounter for immunization        Relevant Orders    influenza vaccine, quadrivalent, 0 5 mL, preservative-free, for adult and pediatric patients 6 mos+ (AFLURIA, FLUARIX, FLULAVAL, FLUZONE) (Completed)    Need for lipid screening        Relevant Orders    Lipid panel    Encounter for screening for HIV        Relevant Orders    Human Immunodeficiency Virus 1/2 Antigen / Antibody ( Fourth Generation) with Reflex Testing          Immunizations and preventive care screenings were discussed with patient today  Appropriate education was printed on patient's after visit summary      Counseling:  Alcohol/drug use: discussed moderation in alcohol intake, the recommendations for healthy alcohol use, and avoidance of illicit drug use  Dental Health: discussed importance of regular tooth brushing, flossing, and dental visits  · Exercise: the importance of regular exercise/physical activity was discussed  Recommend exercise 3-5 times per week for at least 30 minutes  BMI Counseling: Body mass index is 31 26 kg/m²  The BMI is above normal  Nutrition recommendations include encouraging healthy choices of fruits and vegetables, decreasing fast food intake, consuming healthier snacks, moderation in carbohydrate intake and reducing intake of cholesterol  Exercise recommendations include exercising 3-5 times per week  No pharmacotherapy was ordered  Return in about 3 months (around 5/19/2021) for Next scheduled follow up  Chief Complaint:     Chief Complaint   Patient presents with    Annual Exam      History of Present Illness:     Adult Annual Physical   Patient here for a comprehensive physical exam  The patient reports problems - she is considering stopping wellbutrin and may want to change  She has taken it for so long but is still noting anxiety  On lexapro also that she feels helps better  Had been on propranolol before through psychiatrist but isn't sure if it was helpful  She's been off for awhile  Not currently doing counseling as is not covered by insurance  Was helpful in the past       Diet and Physical Activity  · Diet/Nutrition: poor diet, frequent junk food and limited fruits/vegetables  · Exercise: no formal exercise  MP-7 Flowsheet Screening      Most Recent Value   Over the last two weeks, how often have you been bothered by the following problems?     Feeling nervous, anxious, or on edge  2   Not being able to stop or control worrying  2   Worrying too much about different things  2   Trouble relaxing   2   Being so restless that it's hard to sit still  2   Becoming easily annoyed or irritable   2   Feeling afraid as if something awful might happen  1 MP Score   13             Depression Screening  PHQ-9 Depression Screening    PHQ-9:   Frequency of the following problems over the past two weeks:      Little interest or pleasure in doing things: 2 - more than half the days  Feeling down, depressed, or hopeless: 1 - several days  Trouble falling or staying asleep, or sleeping too much: 2 - more than half the days  Feeling tired or having little energy: 2 - more than half the days  Poor appetite or overeatin - more than half the days  Feeling bad about yourself - or that you are a failure or have let yourself or your family down: 2 - more than half the days  Trouble concentrating on things, such as reading the newspaper or watching television: 2 - more than half the days  Moving or speaking so slowly that other people could have noticed  Or the opposite - being so fidgety or restless that you have been moving around a lot more than usual: 0 - not at all  Thoughts that you would be better off dead, or of hurting yourself in some way: 0 - not at all  PHQ-2 Score: 3  PHQ-9 Score: 13       General Health  · Sleep: gets 7-8 hours of sleep on average  · Hearing: normal - bilateral   · Vision: most recent eye exam >1 year ago and wears glasses  · Dental: no dental visits for >1 year  /GYN Health  · Last menstrual period: 2021  · Contraceptive method: oral contraceptives  Review of Systems:     Review of Systems   Constitutional: Negative  HENT: Negative  Eyes: Negative  Respiratory: Negative  Cardiovascular: Negative  Gastrointestinal: Negative  Genitourinary: Negative  Neurological: Negative  Psychiatric/Behavioral: Positive for dysphoric mood  Negative for self-injury, sleep disturbance and suicidal ideas  The patient is nervous/anxious         Past Medical History:     Past Medical History:   Diagnosis Date    BMI 28 0-28 9,adult 2020    Depression     Dysuria     LAST ASSESSED [id number]    Migraine     LAST ASSESSED [id number]    Miscarriage 06/2019    Ovarian cyst       Past Surgical History:     Past Surgical History:   Procedure Laterality Date    DILATION AND CURETTAGE OF UTERUS  06/2019      Social History:        Social History     Socioeconomic History    Marital status: Single     Spouse name: None    Number of children: None    Years of education: 15    Highest education level: None   Occupational History    None   Social Needs    Financial resource strain: None    Food insecurity     Worry: None     Inability: None    Transportation needs     Medical: None     Non-medical: None   Tobacco Use    Smoking status: Light Tobacco Smoker     Types: Cigarettes    Smokeless tobacco: Never Used   Substance and Sexual Activity    Alcohol use: No    Drug use: No    Sexual activity: None   Lifestyle    Physical activity     Days per week: None     Minutes per session: None    Stress: None   Relationships    Social connections     Talks on phone: None     Gets together: None     Attends Shinto service: None     Active member of club or organization: None     Attends meetings of clubs or organizations: None     Relationship status: None    Intimate partner violence     Fear of current or ex partner: None     Emotionally abused: None     Physically abused: None     Forced sexual activity: None   Other Topics Concern    None   Social History Narrative    None      Family History:     Family History   Problem Relation Age of Onset    Irritable bowel syndrome Mother     Iron deficiency Mother         IRON DEFICIENCY ANEMIA LOWEST 5 8     Diabetes Maternal Grandfather       Current Medications:     Current Outpatient Medications   Medication Sig Dispense Refill    buPROPion (WELLBUTRIN) 100 mg tablet Take 1 tablet daily in the morning 45 tablet 2    escitalopram (LEXAPRO) 20 mg tablet Take 1 tablet daily 30 tablet 2    norgestimate-ethinyl estradiol (ORTHO TRI-CYCLEN,TRINESSA) 0 18/0 215/0 25 MG-35 MCG per tablet        No current facility-administered medications for this visit  Allergies:     No Known Allergies   Physical Exam:     /76 (Patient Position: Sitting, Cuff Size: Standard)   Pulse (!) 109   Temp (!) 97 2 °F (36 2 °C) (Temporal)   Ht 5' 4 5" (1 638 m)   Wt 83 9 kg (185 lb)   SpO2 98%   BMI 31 26 kg/m²     Physical Exam  Vitals signs reviewed  Constitutional:       General: She is not in acute distress  Appearance: Normal appearance  She is obese  HENT:      Head: Normocephalic and atraumatic  Right Ear: Tympanic membrane normal       Left Ear: Tympanic membrane normal       Nose: Nose normal       Mouth/Throat:      Mouth: Mucous membranes are moist       Pharynx: Oropharynx is clear  Eyes:      General: No scleral icterus  Neck:      Thyroid: No thyromegaly  Cardiovascular:      Rate and Rhythm: Normal rate and regular rhythm  Heart sounds: No murmur  Pulmonary:      Effort: Pulmonary effort is normal       Breath sounds: Normal breath sounds  Abdominal:      General: Bowel sounds are normal       Palpations: Abdomen is soft  Tenderness: There is no abdominal tenderness  There is no guarding or rebound  Musculoskeletal:      Right lower leg: No edema  Left lower leg: No edema  Lymphadenopathy:      Cervical: No cervical adenopathy  Skin:     General: Skin is warm and dry  Neurological:      General: No focal deficit present  Mental Status: She is alert and oriented to person, place, and time  Psychiatric:         Attention and Perception: Attention normal          Mood and Affect: Mood normal          Speech: Speech normal          Behavior: Behavior normal          Thought Content: Thought content normal          Judgment: Judgment normal       Comments: Relaxed, freely conversive w/good eye contact          Kale De La Torre MD BMI Counseling: Body mass index is 31 26 kg/m²   The BMI is above normal  Nutrition recommendations include 3-5 servings of fruits/vegetables daily, reducing fast food intake, consuming healthier snacks, moderation in carbohydrate intake and reducing intake of cholesterol  Exercise recommendations include exercising 3-5 times per week

## 2021-02-19 NOTE — PATIENT INSTRUCTIONS
Wellness Visit for Adults   AMBULATORY CARE:   A wellness visit  is when you see your healthcare provider to get screened for health problems  Your healthcare provider will also give you advice on how to stay healthy  Write down your questions so you remember to ask them  Ask your healthcare provider how often you should have a wellness visit  What happens at a wellness visit:  Your healthcare provider will ask about your health, and your family history of health problems  This includes high blood pressure, heart disease, and cancer  He or she will ask if you have symptoms that concern you, if you smoke, and about your mood  You may also be asked about your intake of medicines, supplements, food, and alcohol  Any of the following may be done:  · Your weight  will be checked  Your height may also be checked so your body mass index (BMI) can be calculated  Your BMI shows if you are at a healthy weight  · Your blood pressure  and heart rate will be checked  Your temperature may also be checked  · Blood and urine tests  may be done  Blood tests may be done to check your cholesterol levels  Abnormal cholesterol levels increase your risk for heart disease and stroke  You may also need a blood or urine test to check for diabetes if you are at increased risk  Urine tests may be done to look for signs of an infection or kidney disease  · A physical exam  includes checking your heartbeat and lungs with a stethoscope  Your healthcare provider may also check your skin to look for sun damage  · Screening tests  may be recommended  A screening test is done to check for diseases that may not cause symptoms  The screening tests you may need depend on your age, gender, family history, and lifestyle habits  For example, colorectal screening may be recommended if you are 48years old or older  Screening tests you need if you are a woman:   · A Pap smear  is used to screen for cervical cancer   Pap smears are usually done every 3 to 5 years depending on your age  You may need them more often if you have had abnormal Pap smear test results in the past  Ask your healthcare provider how often you should have a Pap smear  · A mammogram  is an x-ray of your breasts to screen for breast cancer  Experts recommend mammograms every 2 years starting at age 48 years  You may need a mammogram at age 52 years or younger if you have an increased risk for breast cancer  Talk to your healthcare provider about when you should start having mammograms and how often you need them  Vaccines you may need:   · Get an influenza vaccine  every year  The influenza vaccine protects you from the flu  Several types of viruses cause the flu  The viruses change over time, so new vaccines are made each year  · Get a tetanus-diphtheria (Td) booster vaccine  every 10 years  This vaccine protects you against tetanus and diphtheria  Tetanus is a severe infection that may cause painful muscle spasms and lockjaw  Diphtheria is a severe bacterial infection that causes a thick covering in the back of your mouth and throat  · Get a human papillomavirus (HPV) vaccine  if you are female and aged 23 to 32 or male 23 to 24 and never received it  This vaccine protects you from HPV infection  HPV is the most common infection spread by sexual contact  HPV may also cause vaginal, penile, and anal cancers  · Get a pneumococcal vaccine  if you are aged 72 years or older  The pneumococcal vaccine is an injection given to protect you from pneumococcal disease  Pneumococcal disease is an infection caused by pneumococcal bacteria  The infection may cause pneumonia, meningitis, or an ear infection  · Get a shingles vaccine  if you are 60 or older, even if you have had shingles before  The shingles vaccine is an injection to protect you from the varicella-zoster virus  This is the same virus that causes chickenpox   Shingles is a painful rash that develops in people who had chickenpox or have been exposed to the virus  How to eat healthy:  My Plate is a model for planning healthy meals  It shows the types and amounts of foods that should go on your plate  Fruits and vegetables make up about half of your plate, and grains and protein make up the other half  A serving of dairy is included on the side of your plate  The amount of calories and serving sizes you need depends on your age, gender, weight, and height  Examples of healthy foods are listed below:  · Eat a variety of vegetables  such as dark green, red, and orange vegetables  You can also include canned vegetables low in sodium (salt) and frozen vegetables without added butter or sauces  · Eat a variety of fresh fruits , canned fruit in 100% juice, frozen fruit, and dried fruit  · Include whole grains  At least half of the grains you eat should be whole grains  Examples include whole-wheat bread, wheat pasta, brown rice, and whole-grain cereals such as oatmeal     · Eat a variety of protein foods such as seafood (fish and shellfish), lean meat, and poultry without skin (turkey and chicken)  Examples of lean meats include pork leg, shoulder, or tenderloin, and beef round, sirloin, tenderloin, and extra lean ground beef  Other protein foods include eggs and egg substitutes, beans, peas, soy products, nuts, and seeds  · Choose low-fat dairy products such as skim or 1% milk or low-fat yogurt, cheese, and cottage cheese  · Limit unhealthy fats  such as butter, hard margarine, and shortening  Exercise:  Exercise at least 30 minutes per day on most days of the week  Some examples of exercise include walking, biking, dancing, and swimming  You can also fit in more physical activity by taking the stairs instead of the elevator or parking farther away from stores  Include muscle strengthening activities 2 days each week  Regular exercise provides many health benefits   It helps you manage your weight, and decreases your risk for type 2 diabetes, heart disease, stroke, and high blood pressure  Exercise can also help improve your mood  Ask your healthcare provider about the best exercise plan for you  General health and safety guidelines:   · Do not smoke  Nicotine and other chemicals in cigarettes and cigars can cause lung damage  Ask your healthcare provider for information if you currently smoke and need help to quit  E-cigarettes or smokeless tobacco still contain nicotine  Talk to your healthcare provider before you use these products  · Limit alcohol  A drink of alcohol is 12 ounces of beer, 5 ounces of wine, or 1½ ounces of liquor  · Lose weight, if needed  Being overweight increases your risk of certain health conditions  These include heart disease, high blood pressure, type 2 diabetes, and certain types of cancer  · Protect your skin  Do not sunbathe or use tanning beds  Use sunscreen with a SPF 15 or higher  Apply sunscreen at least 15 minutes before you go outside  Reapply sunscreen every 2 hours  Wear protective clothing, hats, and sunglasses when you are outside  · Drive safely  Always wear your seatbelt  Make sure everyone in your car wears a seatbelt  A seatbelt can save your life if you are in an accident  Do not use your cell phone when you are driving  This could distract you and cause an accident  Pull over if you need to make a call or send a text message  · Practice safe sex  Use latex condoms if are sexually active and have more than one partner  Your healthcare provider may recommend screening tests for sexually transmitted infections (STIs)  · Wear helmets, lifejackets, and protective gear  Always wear a helmet when you ride a bike or motorcycle, go skiing, or play sports that could cause a head injury  Wear protective equipment when you play sports  Wear a lifejacket when you are on a boat or doing water sports      © Copyright [employer] 2020 Information is for End User's use only and may not be sold, redistributed or otherwise used for commercial purposes  All illustrations and images included in CareNotes® are the copyrighted property of A D A M , Inc  or Brigette Rosenbaum  The above information is an  only  It is not intended as medical advice for individual conditions or treatments  Talk to your doctor, nurse or pharmacist before following any medical regimen to see if it is safe and effective for you  Obesity   AMBULATORY CARE:   Obesity  is when your body mass index (BMI) is greater than 30  Your healthcare provider will use your height and weight to measure your BMI  The risks of obesity include  many health problems, such as injuries or physical disability  You may need tests to check for the following:  · Diabetes    · High blood pressure or high cholesterol    · Heart disease    · Gallbladder or liver disease    · Cancer of the colon, breast, prostate, liver, or kidney    · Sleep apnea    · Arthritis or gout    Seek care immediately if:   · You have a severe headache, confusion, or difficulty speaking  · You have weakness on one side of your body  · You have chest pain, sweating, or shortness of breath  Contact your healthcare provider if:   · You have symptoms of gallbladder or liver disease, such as pain in your upper abdomen  · You have knee or hip pain and discomfort while walking  · You have symptoms of diabetes, such as intense hunger and thirst, and frequent urination  · You have symptoms of sleep apnea, such as snoring or daytime sleepiness  · You have questions or concerns about your condition or care  Treatment for obesity  focuses on helping you lose weight to improve your health  Even a small decrease in BMI can reduce the risk for many health problems  Your healthcare provider will help you set a weight-loss goal   · Lifestyle changes  are the first step in treating obesity   These include making healthy food choices and getting regular physical activity  Your healthcare provider may suggest a weight-loss program that involves coaching, education, and therapy  · Medicine  may help you lose weight when it is used with a healthy diet and physical activity  · Surgery  can help you lose weight if you are very obese and have other health problems  There are several types of weight-loss surgery  Ask your healthcare provider for more information  Be successful losing weight:   · Set small, realistic goals  An example of a small goal is to walk for 20 minutes 5 days a week  Anther goal is to lose 5% of your body weight  · Tell friends, family members, and coworkers about your goals  and ask for their support  Ask a friend to lose weight with you, or join a weight-loss support group  · Identify foods or triggers that may cause you to overeat , and find ways to avoid them  Remove tempting high-calorie foods from your home and workplace  Place a bowl of fresh fruit on your kitchen counter  If stress causes you to eat, then find other ways to cope with stress  · Keep a diary to track what you eat and drink  Also write down how many minutes of physical activity you do each day  Weigh yourself once a week and record it in your diary  Eating changes: You will need to eat 500 to 1,000 fewer calories each day than you currently eat to lose 1 to 2 pounds a week  The following changes will help you cut calories:  · Eat smaller portions  Use small plates, no larger than 9 inches in diameter  Fill your plate half full of fruits and vegetables  Measure your food using measuring cups until you know what a serving size looks like  · Eat 3 meals and 1 or 2 snacks each day  Plan your meals in advance  Zena Fermin and eat at home most of the time  Eat slowly  Do not skip meals  Skipping meals can lead to overeating later in the day  This can make it harder for you to lose weight   Talk with a dietitian to help you make a meal plan and schedule that is right for you  · Eat fruits and vegetables at every meal   They are low in calories and high in fiber, which makes you feel full  Do not add butter, margarine, or cream sauce to vegetables  Use herbs to season steamed vegetables  · Eat less fat and fewer fried foods  Eat more baked or grilled chicken and fish  These protein sources are lower in calories and fat than red meat  Limit fast food  Dress your salads with olive oil and vinegar instead of bottled dressing  · Limit the amount of sugar you eat  Do not drink sugary beverages  Limit alcohol  Activity changes:  Physical activity is good for your body in many ways  It helps you burn calories and build strong muscles  It decreases stress and depression, and improves your mood  It can also help you sleep better  Talk to your healthcare provider before you begin an exercise program   · Exercise for at least 30 minutes 5 days a week  Start slowly  Set aside time each day for physical activity that you enjoy and that is convenient for you  It is best to do both weight training and an activity that increases your heart rate, such as walking, bicycling, or swimming  · Find ways to be more active  Do yard work and housecleaning  Walk up the stairs instead of using elevators  Spend your leisure time going to events that require walking, such as outdoor festivals or fairs  This extra physical activity can help you lose weight and keep it off  Follow up with your healthcare provider as directed: You may need to meet with a dietitian  Write down your questions so you remember to ask them during your visits  © Copyright [address] Information is for End User's use only and may not be sold, redistributed or otherwise used for commercial purposes   All illustrations and images included in CareNotes® are the copyrighted property of A D A M , Inc  or [facility] [place] [facility]   The above information is an  only  It is not intended as medical advice for individual conditions or treatments  Talk to your doctor, nurse or pharmacist before following any medical regimen to see if it is safe and effective for you

## 2021-03-18 DIAGNOSIS — F41.8 DEPRESSION WITH ANXIETY: ICD-10-CM

## 2021-03-19 RX ORDER — ESCITALOPRAM OXALATE 20 MG/1
TABLET ORAL
Qty: 30 TABLET | Refills: 2 | Status: SHIPPED | OUTPATIENT
Start: 2021-03-19 | End: 2021-05-21 | Stop reason: SDUPTHER

## 2021-03-29 ENCOUNTER — APPOINTMENT (OUTPATIENT)
Dept: URGENT CARE | Facility: CLINIC | Age: 21
End: 2021-03-29

## 2021-03-29 DIAGNOSIS — Z11.59 SCREENING EXAMINATION FOR POLIOMYELITIS: Primary | ICD-10-CM

## 2021-03-29 PROCEDURE — U0003 INFECTIOUS AGENT DETECTION BY NUCLEIC ACID (DNA OR RNA); SEVERE ACUTE RESPIRATORY SYNDROME CORONAVIRUS 2 (SARS-COV-2) (CORONAVIRUS DISEASE [COVID-19]), AMPLIFIED PROBE TECHNIQUE, MAKING USE OF HIGH THROUGHPUT TECHNOLOGIES AS DESCRIBED BY CMS-2020-01-R: HCPCS

## 2021-03-29 PROCEDURE — U0005 INFEC AGEN DETEC AMPLI PROBE: HCPCS

## 2021-03-30 LAB — SARS-COV-2 RNA RESP QL NAA+PROBE: NEGATIVE

## 2021-05-18 ENCOUNTER — IMMUNIZATIONS (OUTPATIENT)
Dept: FAMILY MEDICINE CLINIC | Facility: HOSPITAL | Age: 21
End: 2021-05-18

## 2021-05-18 DIAGNOSIS — Z23 ENCOUNTER FOR IMMUNIZATION: Primary | ICD-10-CM

## 2021-05-18 PROCEDURE — 91300 SARS-COV-2 / COVID-19 MRNA VACCINE (PFIZER-BIONTECH) 30 MCG: CPT

## 2021-05-18 PROCEDURE — 0001A SARS-COV-2 / COVID-19 MRNA VACCINE (PFIZER-BIONTECH) 30 MCG: CPT

## 2021-05-21 ENCOUNTER — TELEMEDICINE (OUTPATIENT)
Dept: FAMILY MEDICINE CLINIC | Facility: HOSPITAL | Age: 21
End: 2021-05-21
Payer: COMMERCIAL

## 2021-05-21 VITALS — TEMPERATURE: 97.6 F | WEIGHT: 178 LBS | HEIGHT: 65 IN | BODY MASS INDEX: 29.66 KG/M2

## 2021-05-21 DIAGNOSIS — F41.8 DEPRESSION WITH ANXIETY: ICD-10-CM

## 2021-05-21 PROCEDURE — 3725F SCREEN DEPRESSION PERFORMED: CPT | Performed by: NURSE PRACTITIONER

## 2021-05-21 PROCEDURE — 99213 OFFICE O/P EST LOW 20 MIN: CPT | Performed by: NURSE PRACTITIONER

## 2021-05-21 PROCEDURE — 3008F BODY MASS INDEX DOCD: CPT | Performed by: NURSE PRACTITIONER

## 2021-05-21 RX ORDER — ESCITALOPRAM OXALATE 20 MG/1
20 TABLET ORAL DAILY
Qty: 30 TABLET | Refills: 2 | Status: SHIPPED | OUTPATIENT
Start: 2021-05-21

## 2021-05-21 RX ORDER — BUPROPION HYDROCHLORIDE 100 MG/1
TABLET ORAL
Qty: 30 TABLET | Refills: 2 | Status: SHIPPED | OUTPATIENT
Start: 2021-05-21 | End: 2021-07-11

## 2021-05-21 NOTE — PROGRESS NOTES
Virtual Regular Visit      Assessment/Plan:    Problem List Items Addressed This Visit        Other    Depression with anxiety     Stable mood control w/PHQ score of 9 and MP score of 11 on lexapro and wellbutrin  Advise she adress ADHD concerns w/psych and/or therapist for testing and definitive diagnosis before considering starting medication - she voices understanding and is agreeable  Schedule 3 month follow up in office  Call sooner w/any mood concerns/changes  Relevant Medications    escitalopram (LEXAPRO) 20 mg tablet    buPROPion (WELLBUTRIN) 100 mg tablet               Reason for visit is   Chief Complaint   Patient presents with    Follow-up    Virtual Regular Visit        Encounter provider Mor Huertas    Provider located at [address] [facility]  [address] [place] [address]      Recent Visits  Date Type Provider Dept   05/21/21 Telemedicine [facility]IRIAM [facility]as [facility]   Showing recent visits within past 7 days and meeting all other requirements     Future Appointments  No visits were found meeting these conditions  Showing future appointments within next 150 days and meeting all other requirements        The patient was identified by name and date of birth  Mustapha Armas was informed that this is a telemedicine visit and that the visit is being conducted through [address] [place] [address] Now and patient was informed that this is a secure, HIPAA-compliant platform  She agrees to proceed     My office door was closed  No one else was in the room  She acknowledged consent and understanding of privacy and security of the video platform  The patient has agreed to participate and understands they can discontinue the visit at any time  Patient is aware this is a billable service  Subjective  Mustapha Armas is a 24 y o  female due for med check  States she is doing OK   Feels like she has to constantly have something in her mouth and picks at her skin  Feels like she constantly has to be doing something  Has never been diagnosed or tested for ADHD but she questions this  Is becoming more interfering w/day to day life  Having a harder time focusing  Gets easily distracted  She had to restart wellbutrin because she was too tired/lethargic on just lexapro  Hasn't seen her therapist in a few months because she left for a private practice and insurance will not cover there  She has been previously established w/[facility]  Past Medical History:   Diagnosis Date    BMI 28 0-28 9,adult 1/17/2020    Depression     Dysuria     LAST ASSESSED [id number]    Migraine     LAST ASSESSED [id number]    Miscarriage 06/2019    Ovarian cyst        Past Surgical History:   Procedure Laterality Date    DILATION AND CURETTAGE OF UTERUS  06/2019       Current Outpatient Medications   Medication Sig Dispense Refill    buPROPion (WELLBUTRIN) 100 mg tablet Take 1 tablet daily in the morning 30 tablet 2    escitalopram (LEXAPRO) 20 mg tablet Take 1 tablet (20 mg total) by mouth daily 30 tablet 2    norgestimate-ethinyl estradiol (ORTHO TRI-CYCLEN,TRINESSA) 0 18/0 215/0 25 MG-35 MCG per tablet        No current facility-administered medications for this visit  No Known Allergies    Review of Systems   Psychiatric/Behavioral: Positive for decreased concentration and dysphoric mood  The patient is nervous/anxious  Video Exam    Vitals:    05/21/21 1530   Temp: 97 6 °F (36 4 °C)   Weight: 80 7 kg (178 lb)   Height: 5' 4 5" (1 638 m)       Physical Exam  Vitals signs reviewed  Constitutional:       General: She is not in acute distress  Appearance: Normal appearance  HENT:      Head: Normocephalic and atraumatic  Pulmonary:      Effort: Pulmonary effort is normal  No respiratory distress  Neurological:      General: No focal deficit present  Mental Status: She is alert and oriented to person, place, and time  Psychiatric:         Mood and Affect: Mood normal          Behavior: Behavior normal          Thought Content: Thought content normal          Judgment: Judgment normal       Comments: Freely conversive w/good eye contact          PHQ-9 Depression Screening    PHQ-9:   Frequency of the following problems over the past two weeks:      Little interest or pleasure in doing things: 1 - several days  Feeling down, depressed, or hopeless: 1 - several days  Trouble falling or staying asleep, or sleeping too much: 2 - more than half the days  Feeling tired or having little energy: 2 - more than half the days  Poor appetite or overeatin - more than half the days  Feeling bad about yourself - or that you are a failure or have let yourself or your family down: 1 - several days  Trouble concentrating on things, such as reading the newspaper or watching television: 0 - not at all  Moving or speaking so slowly that other people could have noticed  Or the opposite - being so fidgety or restless that you have been moving around a lot more than usual: 0 - not at all  Thoughts that you would be better off dead, or of hurting yourself in some way: 0 - not at all  PHQ-2 Score: 2  PHQ-9 Score: 9           MP-7 Flowsheet Screening      Most Recent Value   Over the last two weeks, how often have you been bothered by the following problems? Feeling nervous, anxious, or on edge  3   Not being able to stop or control worrying  1   Worrying too much about different things  1   Trouble relaxing   1   Being so restless that it's hard to sit still  1   Becoming easily annoyed or irritable   3   Feeling afraid as if something awful might happen  1   How difficult have these problems made it for you to do your work, take care of things at home, or get along with other people?    Somewhat difficult   MP Score   11            I spent 10 minutes with patient today in which greater than 50% of the time was spent in counseling/coordination of care regarding plan of care      VIRTUAL VISIT DISCLAIMER    Mustapha Armas acknowledges that she has consented to an online visit or consultation  She understands that the online visit is based solely on information provided by her, and that, in the absence of a face-to-face physical evaluation by the physician, the diagnosis she receives is both limited and provisional in terms of accuracy and completeness  This is not intended to replace a full medical face-to-face evaluation by the physician  Mustapha Armas understands and accepts these terms

## 2021-05-21 NOTE — ASSESSMENT & PLAN NOTE
Stable mood control w/PHQ score of 9 and MP score of 11 on lexapro and wellbutrin  Advise she adress ADHD concerns w/psych and/or therapist for testing and definitive diagnosis before considering starting medication - she voices understanding and is agreeable  Schedule 3 month follow up in office  Call sooner w/any mood concerns/changes

## 2021-06-12 ENCOUNTER — IMMUNIZATIONS (OUTPATIENT)
Dept: FAMILY MEDICINE CLINIC | Facility: HOSPITAL | Age: 21
End: 2021-06-12

## 2021-06-12 DIAGNOSIS — Z23 ENCOUNTER FOR IMMUNIZATION: Primary | ICD-10-CM

## 2021-06-12 PROCEDURE — 91300 SARS-COV-2 / COVID-19 MRNA VACCINE (PFIZER-BIONTECH) 30 MCG: CPT

## 2021-06-12 PROCEDURE — 0002A SARS-COV-2 / COVID-19 MRNA VACCINE (PFIZER-BIONTECH) 30 MCG: CPT

## 2021-09-01 DIAGNOSIS — F41.8 DEPRESSION WITH ANXIETY: ICD-10-CM

## 2021-09-01 RX ORDER — BUPROPION HYDROCHLORIDE 100 MG/1
TABLET ORAL
Qty: 30 TABLET | Refills: 0 | OUTPATIENT
Start: 2021-09-01

## 2021-09-01 NOTE — TELEPHONE ENCOUNTER
Duplicate request  Patient needs an appt   Attempted to call multiple times and no call back  Postcard mailed to patient

## 2021-12-22 ENCOUNTER — HOSPITAL ENCOUNTER (EMERGENCY)
Facility: HOSPITAL | Age: 21
Discharge: HOME | End: 2021-12-22
Attending: EMERGENCY MEDICINE | Admitting: EMERGENCY MEDICINE
Payer: COMMERCIAL

## 2021-12-22 VITALS
WEIGHT: 198.41 LBS | BODY MASS INDEX: 33.53 KG/M2 | RESPIRATION RATE: 18 BRPM | OXYGEN SATURATION: 98 % | TEMPERATURE: 97.3 F | HEART RATE: 77 BPM

## 2021-12-22 DIAGNOSIS — N39.0 UTI (URINARY TRACT INFECTION): Primary | ICD-10-CM

## 2021-12-22 LAB
BACTERIA UR QL AUTO: ABNORMAL /HPF
BILIRUB UR QL STRIP: NEGATIVE
CLARITY UR: ABNORMAL
COLOR UR: YELLOW
GLUCOSE UR STRIP-MCNC: NEGATIVE MG/DL
HGB UR QL STRIP.AUTO: ABNORMAL
KETONES UR STRIP-MCNC: NEGATIVE MG/DL
LEUKOCYTE ESTERASE UR QL STRIP: ABNORMAL
NITRITE UR QL STRIP: NEGATIVE
NON-SQ EPI CELLS URNS QL MICRO: ABNORMAL /HPF
OTHER STN SPEC: ABNORMAL
PH UR STRIP.AUTO: 6 [PH]
PROT UR STRIP-MCNC: ABNORMAL MG/DL
RBC #/AREA URNS AUTO: ABNORMAL /HPF
SP GR UR STRIP.AUTO: 1.02 (ref 1–1.03)
UROBILINOGEN UR QL STRIP.AUTO: 0.2 E.U./DL
WBC #/AREA URNS AUTO: ABNORMAL /HPF

## 2021-12-22 PROCEDURE — 99283 EMERGENCY DEPT VISIT LOW MDM: CPT

## 2021-12-22 PROCEDURE — 99284 EMERGENCY DEPT VISIT MOD MDM: CPT | Performed by: PHYSICIAN ASSISTANT

## 2021-12-22 PROCEDURE — 87086 URINE CULTURE/COLONY COUNT: CPT | Performed by: PHYSICIAN ASSISTANT

## 2021-12-22 PROCEDURE — 87077 CULTURE AEROBIC IDENTIFY: CPT | Performed by: PHYSICIAN ASSISTANT

## 2021-12-22 PROCEDURE — 81001 URINALYSIS AUTO W/SCOPE: CPT | Performed by: PHYSICIAN ASSISTANT

## 2021-12-22 RX ORDER — ONDANSETRON 4 MG/1
4 TABLET, ORALLY DISINTEGRATING ORAL EVERY 6 HOURS PRN
Qty: 20 TABLET | Refills: 0 | Status: SHIPPED | OUTPATIENT
Start: 2021-12-22

## 2021-12-22 RX ORDER — NITROFURANTOIN 25; 75 MG/1; MG/1
100 CAPSULE ORAL 2 TIMES DAILY
Qty: 10 CAPSULE | Refills: 0 | Status: SHIPPED | OUTPATIENT
Start: 2021-12-22

## 2021-12-22 RX ORDER — IBUPROFEN 400 MG/1
400 TABLET ORAL ONCE
Status: COMPLETED | OUTPATIENT
Start: 2021-12-22 | End: 2021-12-22

## 2021-12-22 RX ORDER — ACETAMINOPHEN 325 MG/1
650 TABLET ORAL ONCE
Status: COMPLETED | OUTPATIENT
Start: 2021-12-22 | End: 2021-12-22

## 2021-12-22 RX ORDER — LAMOTRIGINE 100 MG/1
100 TABLET ORAL DAILY
COMMUNITY

## 2021-12-22 RX ORDER — ONDANSETRON 4 MG/1
4 TABLET, ORALLY DISINTEGRATING ORAL ONCE
Status: COMPLETED | OUTPATIENT
Start: 2021-12-22 | End: 2021-12-22

## 2021-12-22 RX ADMIN — ONDANSETRON 4 MG: 4 TABLET, ORALLY DISINTEGRATING ORAL at 10:30

## 2021-12-22 RX ADMIN — ACETAMINOPHEN 650 MG: 325 TABLET, FILM COATED ORAL at 10:30

## 2021-12-22 RX ADMIN — IBUPROFEN 400 MG: 400 TABLET, FILM COATED ORAL at 10:30

## 2021-12-23 LAB — BACTERIA UR CULT: ABNORMAL

## 2022-01-02 ENCOUNTER — HOSPITAL ENCOUNTER (EMERGENCY)
Facility: HOSPITAL | Age: 22
Discharge: HOME | End: 2022-01-02
Attending: EMERGENCY MEDICINE
Payer: COMMERCIAL

## 2022-01-02 VITALS
TEMPERATURE: 97.8 F | SYSTOLIC BLOOD PRESSURE: 108 MMHG | RESPIRATION RATE: 20 BRPM | HEART RATE: 83 BPM | DIASTOLIC BLOOD PRESSURE: 57 MMHG | HEIGHT: 64 IN | BODY MASS INDEX: 31.24 KG/M2 | OXYGEN SATURATION: 94 % | WEIGHT: 183 LBS
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
## 2022-01-02 PROCEDURE — 99283 EMERGENCY DEPT VISIT LOW MDM: CPT

## 2022-01-02 PROCEDURE — 99284 EMERGENCY DEPT VISIT MOD MDM: CPT | Performed by: EMERGENCY MEDICINE

## 2022-01-02 PROCEDURE — 90375 RABIES IG IM/SC: CPT | Performed by: EMERGENCY MEDICINE

## 2022-01-02 PROCEDURE — 90471 IMMUNIZATION ADMIN: CPT

## 2022-01-02 PROCEDURE — 90675 RABIES VACCINE IM: CPT | Performed by: EMERGENCY MEDICINE

## 2022-01-02 PROCEDURE — 96372 THER/PROPH/DIAG INJ SC/IM: CPT

## 2022-01-02 RX ADMIN — RABIES IMMUNE GLOBULIN (HUMAN) 1650 UNITS: 300 INJECTION, SOLUTION INFILTRATION; INTRAMUSCULAR at 16:12

## 2022-01-02 RX ADMIN — Medication 1 ML: at 16:20

## 2022-01-02 NOTE — DISCHARGE INSTRUCTIONS
Review with vet if cat can be autopsied  If not, follow-up with rabies vaccine series days 0,3,7, and 14

## 2022-01-02 NOTE — ED PROVIDER NOTES
History  Chief Complaint   Patient presents with    Cat Bite     Pt with left bicep cat bite, pts cat  yesterday  Pts cat was vaccinated as a kitten was 1years old yesterday  Pt had tetanus vaccine last year  Patient was bit by ill cat 4 days ago at home - it was her pet cat who had jaundice and is now dead  The cat was acting normal as it was trying to die and she was trying to feed it  The cat was 1years old, indoor cat, but not up to date with vaccines  Patient was bit at left biceps  That area is now bruising  She has no fevers  Patient is up to date with rabies vaccine  They will call the vet tomorrow to see if they can get autopsy  Prior to Admission Medications   Prescriptions Last Dose Informant Patient Reported? Taking?    buPROPion (WELLBUTRIN) 100 mg tablet 2022 at Unknown time  No Yes   Sig: TAKE ONE TABLET BY MOUTH DAILY IN THE MORNING   escitalopram (LEXAPRO) 20 mg tablet 2022 at Unknown time  No Yes   Sig: Take 1 tablet (20 mg total) by mouth daily   lamoTRIgine (LaMICtal) 100 mg tablet 2022 at Unknown time Self Yes Yes   Sig: Take 100 mg by mouth daily   nitrofurantoin (MACROBID) 100 mg capsule Not Taking at Unknown time  No No   Sig: Take 1 capsule (100 mg total) by mouth 2 (two) times a day   Patient not taking: Reported on 2022    norgestimate-ethinyl estradiol (ORTHO TRI-CYCLEN,TRINESSA) 0 18/0 215/0 25 MG-35 MCG per tablet 2022 at Unknown time  Yes Yes   ondansetron (Zofran ODT) 4 mg disintegrating tablet Not Taking at Unknown time  No No   Sig: Take 1 tablet (4 mg total) by mouth every 6 (six) hours as needed for nausea or vomiting   Patient not taking: Reported on 2022       Facility-Administered Medications: None       Past Medical History:   Diagnosis Date    BMI 28 0-28 9,adult 2020    Depression     Dysuria     LAST ASSESSED [id number]    Migraine     LAST ASSESSED [id number]    Miscarriage 2019    Ovarian cyst        Past Surgical History:   Procedure Laterality Date    DILATION AND CURETTAGE OF UTERUS  06/2019       Family History   Problem Relation Age of Onset    Irritable bowel syndrome Mother     Iron deficiency Mother         IRON DEFICIENCY ANEMIA LOWEST 5 8     Diabetes Maternal Grandfather      I have reviewed and agree with the history as documented  E-Cigarette/Vaping    E-Cigarette Use Current Every Day User      E-Cigarette/Vaping Substances    Nicotine Yes     THC No     CBD No     Flavoring Yes     Other No     Unknown No      Social History     Tobacco Use    Smoking status: Light Tobacco Smoker     Types: Cigarettes     Start date: 2019    Smokeless tobacco: Never Used   Vaping Use    Vaping Use: Every day    Substances: Nicotine, Flavoring   Substance Use Topics    Alcohol use: No    Drug use: No       Review of Systems   Constitutional: Negative for chills and fever  HENT: Negative for rhinorrhea and sore throat  Respiratory: Negative for shortness of breath  Cardiovascular: Negative for chest pain  Gastrointestinal: Negative for constipation, diarrhea, nausea and vomiting  Genitourinary: Negative for dysuria and frequency  Skin: Negative for rash  All other systems reviewed and are negative  Physical Exam  Physical Exam  Vitals and nursing note reviewed  Constitutional:       Appearance: She is well-developed  HENT:      Head: Normocephalic and atraumatic  Right Ear: External ear normal       Left Ear: External ear normal       Nose: Nose normal    Eyes:      Conjunctiva/sclera: Conjunctivae normal       Pupils: Pupils are equal, round, and reactive to light  Cardiovascular:      Rate and Rhythm: Normal rate and regular rhythm  Heart sounds: Normal heart sounds  Pulmonary:      Effort: Pulmonary effort is normal  No respiratory distress  Breath sounds: Normal breath sounds  No wheezing     Abdominal:      General: Bowel sounds are normal  There is no distension  Palpations: Abdomen is soft  Tenderness: There is no abdominal tenderness  Musculoskeletal:         General: No deformity  Normal range of motion  Arms:       Cervical back: Normal range of motion and neck supple  No spinous process tenderness  Skin:     General: Skin is warm and dry  Findings: No rash  Neurological:      General: No focal deficit present  Mental Status: She is alert  GCS: GCS eye subscore is 4  GCS verbal subscore is 5  GCS motor subscore is 6  Sensory: No sensory deficit  Psychiatric:         Mood and Affect: Mood normal          Vital Signs  ED Triage Vitals [01/02/22 1340]   Temperature Pulse Respirations Blood Pressure SpO2   97 8 °F (36 6 °C) 83 20 108/57 94 %      Temp Source Heart Rate Source Patient Position - Orthostatic VS BP Location FiO2 (%)   Temporal Monitor Sitting Left arm --      Pain Score       --           Vitals:    01/02/22 1340   BP: 108/57   Pulse: 83   Patient Position - Orthostatic VS: Sitting         Visual Acuity      ED Medications  Medications   rabies vaccine, human diploid (IMOVAX RABIES) IM injection 1 mL (1 mL Intramuscular Given 1/2/22 1620)   rabies immune globulin, human (HyperRAB) injection 1,650 Units (1,650 Units Infiltration Given 1/2/22 1612)       Diagnostic Studies  Results Reviewed     None                 No orders to display              Procedures  Procedures         ED Course                               SBIRT 22yo+      Most Recent Value   SBIRT (24 yo +)    In order to provide better care to our patients, we are screening all of our patients for alcohol and drug use  Would it be okay to ask you these screening questions? Yes Filed at: 01/02/2022 1410   Initial Alcohol Screen: US AUDIT-C     1  How often do you have a drink containing alcohol? 0 Filed at: 01/02/2022 1410   2  How many drinks containing alcohol do you have on a typical day you are drinking? 0 Filed at: 01/02/2022 1410   3a  Male UNDER 65: How often do you have five or more drinks on one occasion? 0 Filed at: 01/02/2022 1410   3b  FEMALE Any Age, or MALE 65+: How often do you have 4 or more drinks on one occassion? 0 Filed at: 01/02/2022 1410   Audit-C Score 0 Filed at: 01/02/2022 1410   DM: How many times in the past year have you    Used an illegal drug or used a prescription medication for non-medical reasons? Never Filed at: 01/02/2022 1410                    [facility]  Number of Diagnoses or Management Options  Cat bite, initial encounter: new and does not require workup  Patient Progress  Patient progress: improved      Disposition  Final diagnoses:   Cat bite, initial encounter - acute left upper arm, cat not up to date with vaccines  Time reflects when diagnosis was documented in both MDM as applicable and the Disposition within this note     Time User Action Codes Description Comment    1/2/2022  3:56 PM Amina Summers [facility]  Cat bite, initial encounter     1/2/2022  3:56 PM Sulma Restrepo Modify [[id number]] Cat bite, initial encounter acute left upper arm, cat not up to date with vaccines  ED Disposition     ED Disposition Condition Date/Time Comment    Discharge Stable Sun Jan 2, 2022  3:56 PM Keisha Rowe discharge to home/self care              Follow-up Information     Follow up With Specialties Details Why Contact Info Additional Information    [facility] Go on 1/5/2022  [address] [id number]  [address] [facility] [address], [address] [facility]          Discharge Medication List as of 1/2/2022  4:00 PM      CONTINUE these medications which have NOT CHANGED    Details   buPROPion (WELLBUTRIN) 100 mg tablet TAKE ONE TABLET BY MOUTH DAILY IN THE MORNING, Normal      escitalopram (LEXAPRO) 20 mg tablet Take 1 tablet (20 mg total) by mouth daily, Starting Fri 5/21/2021, Normal      lamoTRIgine (LaMICtal) 100 mg tablet Take 100 mg by mouth daily, Historical Med      norgestimate-ethinyl estradiol (ORTHO TRI-CYCLEN,TRINESSA) 0 18/0 215/0 25 MG-35 MCG per tablet Starting Tue 1/23/2018, Historical Med      nitrofurantoin (MACROBID) 100 mg capsule Take 1 capsule (100 mg total) by mouth 2 (two) times a day, Starting Wed 12/22/2021, Normal      ondansetron (Zofran ODT) 4 mg disintegrating tablet Take 1 tablet (4 mg total) by mouth every 6 (six) hours as needed for nausea or vomiting, Starting Wed 12/22/2021, Normal             No discharge procedures on file      PDMP Review     None          ED Provider  Electronically Signed by           Jarrod Santos DO  01/02/22 5172

## 2022-01-05 ENCOUNTER — OFFICE VISIT (OUTPATIENT)
Dept: URGENT CARE | Facility: CLINIC | Age: 22
End: 2022-01-05
Payer: COMMERCIAL

## 2022-01-05 DIAGNOSIS — Z20.3 RABIES EXPOSURE: Primary | ICD-10-CM

## 2022-01-05 PROCEDURE — 90675 RABIES VACCINE IM: CPT

## 2022-01-05 PROCEDURE — 90471 IMMUNIZATION ADMIN: CPT

## 2022-01-14 ENCOUNTER — OFFICE VISIT (OUTPATIENT)
Dept: URGENT CARE | Facility: CLINIC | Age: 22
End: 2022-01-14
Payer: COMMERCIAL

## 2022-01-14 DIAGNOSIS — T14.8XXA BITE BY ANIMAL: Primary | ICD-10-CM

## 2022-01-14 PROCEDURE — 90675 RABIES VACCINE IM: CPT

## 2022-01-14 PROCEDURE — 90471 IMMUNIZATION ADMIN: CPT

## 2022-06-30 ENCOUNTER — TELEPHONE (OUTPATIENT)
Dept: FAMILY MEDICINE CLINIC | Facility: HOSPITAL | Age: 22
End: 2022-06-30

## 2022-06-30 NOTE — TELEPHONE ENCOUNTER
Patient seen within 3 years - postcard mailed advising patient to call and schedule or to let us know if they have switched providers

## 2022-07-12 ENCOUNTER — TELEPHONE (OUTPATIENT)
Dept: FAMILY MEDICINE CLINIC | Facility: HOSPITAL | Age: 22
End: 2022-07-12

## 2022-07-12 NOTE — TELEPHONE ENCOUNTER
PATIENT TESTED COVID + ON SATURDAY - WAS TOLD THAT SHE NEEDS A NEGATIVE PCR TEST TO RETURN TO WORK [id number] - PLEASE ADVISE

## 2022-07-12 NOTE — TELEPHONE ENCOUNTER
Pt reports she needs a negative PCR test to return to work    should we have her come today for a covid swab   any recommendations   thanks

## 2022-07-12 NOTE — TELEPHONE ENCOUNTER
Employer is ok w/ note - please make it for return to work date of 7/15      Pls let patient know when note is complete

## 2022-07-12 NOTE — TELEPHONE ENCOUNTER
A PCR swab will likely still be positive rechecking so early in illness  Per CDC guidelines, she should be able to return to work after 5 days of isolation without retesting  Will her work accept a note from me stating this?

## 2022-09-19 ENCOUNTER — TELEMEDICINE (OUTPATIENT)
Dept: BEHAVIORAL/MENTAL HEALTH CLINIC | Facility: CLINIC | Age: 22
End: 2022-09-19
Payer: COMMERCIAL

## 2022-09-19 DIAGNOSIS — F41.8 DEPRESSION WITH ANXIETY: Primary | ICD-10-CM

## 2022-09-19 DIAGNOSIS — F43.23 ADJUSTMENT DISORDER WITH MIXED ANXIETY AND DEPRESSED MOOD: ICD-10-CM

## 2022-09-19 PROCEDURE — 90834 PSYTX W PT 45 MINUTES: CPT

## 2022-09-19 PROCEDURE — 90834 PSYTX W PT 45 MINUTES: CPT | Performed by: COUNSELOR

## 2022-09-19 NOTE — PSYCH
Virtual Regular Visit    Verification of patient location:    Patient is located in the following state in which I hold an active license [place]      Assessment/Plan:    Problem List Items Addressed This Visit        Other    Depression with anxiety - Primary    Adjustment disorder with mixed anxiety and depressed mood          Goals addressed in session: Goal 1          Reason for visit is No chief complaint on file  Encounter provider Tacho Martínez, [facility]    Provider located at [address] [place], [place] [id number]  [address] [place] [address]  [phone]      Recent Visits  No visits were found meeting these conditions  Showing recent visits within past 7 days and meeting all other requirements  Future Appointments  No visits were found meeting these conditions  Showing future appointments within next 150 days and meeting all other requirements       The patient was identified by name and date of birth  Surya Jimenez was informed that this is a telemedicine visit and that the visit is being conducted throughDiana and patient was informed that this is a secure, HIPAA-compliant platform  She agrees to proceed     My office door was closed  No one else was in the room  She acknowledged consent and understanding of privacy and security of the video platform  The patient has agreed to participate and understands they can discontinue the visit at any time  Patient is aware this is a billable service  Subjective  Surya Jimenez is a 20 y o  female     D:  -CT reported improvements with regard to room mate  -CT reported still feeling nervous about financial obligations and luis enrique  CT noed her supportive mantra given past experience "things have worked out in [place] and will likely again"  -CT stated that she does not have a good backup plan which leaves her feeling a bit uneasy     -CT described the difficulty of living at home previously  "father is not welcoming and will not allow the cats to live there  My dad is the starr, corina"  -CT noted that her own difficulties are a result of her own parent's unresolved issues  CT identifies her eating and body image challenges as learned responses to mother's difficulties  -TH invited CT to describe her challenges  CT does not eat breakfast, lunch often and tends to binge after work  CT noted concerns about feeling low energy after eating  Further discussion revealed lack of clarity about food choices and timing of eating  TH suggested that CT consider consulting a nutritionist to gain  Knowledge and understanding of more supportive eating habits  CT will discuss with PCP and consider cost prior to engaging  CT referenced her PTSD several times  TH suggested PCL5 survey to assess  After discussion and TH offering this as optional at this session, CT chose to delay survey b/c of poor timing of possible triggering  Will revisit  A:  CT presented with no SI, HI, self-harm or DnA challenges  CT mood was stable with even mood  CT is coping with current stressors and has adopted a supportive mantra  CT chose to delay PTSD, trauma related support at this time  CT demonstrating self-care  TH offered empathy, reflective listening and unconditional positive regard  P:  Will consider if PCL-5 survey appropriate at next session depending on circumstance  CT will inquire about cost of nutritionist and decide about engagement  CT will utilize her mantra when worries/concerns prominent         HPI     Past Medical History:   Diagnosis Date    BMI 28 0-28 9,adult 1/17/2020    Depression     Dysuria     LAST ASSESSED [id number]    Migraine     LAST ASSESSED [id number]    Miscarriage 06/2019    Ovarian cyst        Past Surgical History:   Procedure Laterality Date    DILATION AND CURETTAGE OF UTERUS  06/2019       Current Outpatient Medications   Medication Sig Dispense Refill    buPROPion (WELLBUTRIN) 100 mg tablet TAKE ONE TABLET BY MOUTH DAILY IN THE MORNING 30 tablet 0    escitalopram (LEXAPRO) 20 mg tablet Take 1 tablet (20 mg total) by mouth daily 30 tablet 2    lamoTRIgine (LaMICtal) 100 mg tablet Take 100 mg by mouth daily      nitrofurantoin (MACROBID) 100 mg capsule Take 1 capsule (100 mg total) by mouth 2 (two) times a day (Patient not taking: Reported on 1/2/2022 ) 10 capsule 0    norgestimate-ethinyl estradiol (ORTHO TRI-CYCLEN,TRINESSA) 0 18/0 215/0 25 MG-35 MCG per tablet       ondansetron (Zofran ODT) 4 mg disintegrating tablet Take 1 tablet (4 mg total) by mouth every 6 (six) hours as needed for nausea or vomiting (Patient not taking: Reported on 1/2/2022 ) 20 tablet 0     No current facility-administered medications for this visit  No Known Allergies    Review of Systems    Video Exam    There were no vitals filed for this visit      Physical Exam     I spent 46 minutes directly with the patient during this visit   Session: 1:14pm-1:59pm

## 2022-10-10 ENCOUNTER — TELEMEDICINE (OUTPATIENT)
Dept: BEHAVIORAL/MENTAL HEALTH CLINIC | Facility: CLINIC | Age: 22
End: 2022-10-10
Payer: COMMERCIAL

## 2022-10-10 DIAGNOSIS — F43.23 ADJUSTMENT DISORDER WITH MIXED ANXIETY AND DEPRESSED MOOD: Primary | ICD-10-CM

## 2022-10-10 PROCEDURE — 90834 PSYTX W PT 45 MINUTES: CPT | Performed by: COUNSELOR

## 2022-10-10 NOTE — PSYCH
Virtual Regular Visit    Verification of patient location:    Patient is located in the following state in which I hold an active license [place]      Assessment/Plan:    Problem List Items Addressed This Visit    None         Goals addressed in session: Goal 1          Reason for visit is No chief complaint on file  Encounter provider Bindu Jean, [facility]    Provider located at [address] [place] [address] [address]  [address] [place] [address]  [phone]      Recent Visits  No visits were found meeting these conditions  Showing recent visits within past 7 days and meeting all other requirements  Future Appointments  No visits were found meeting these conditions  Showing future appointments within next 150 days and meeting all other requirements       The patient was identified by name and date of birth  Renan Bartlett was informed that this is a telemedicine visit and that the visit is being conducted throughNileGuide and patient was informed that this is a secure, HIPAA-compliant platform  She agrees to proceed     My office door was closed  No one else was in the room  She acknowledged consent and understanding of privacy and security of the video platform  The patient has agreed to participate and understands they can discontinue the visit at any time  Patient is aware this is a billable service  Subjective  Renan Bartlett is a 20 y o  female   D:  -CT reported that she and her  had to give up their apartment  They will not have an eviction notice on record  CT has moved into her partent's home and her  has moved into his parent's house  It was a week ago that this took place  -CT has been able to keep the cats  -CT talked about the underlying tension in her family towards her   CT's father especially wants her  to prove his ability support/hold up the financial commitment of a partnership   Each of the families have put in stipulations for 's financial responsibility  -CT stated that she feels relieved and comfortable to be in her parent's home, focused on work, the cats, and her self-care  (reports getting good rest and feeling positive)  A:  CT presented with no SI, HI, self-harm, or DnA challenges  CT mood was good  CT has an optimistic view of current situation  CT aware that current living situation is in her best interest and in agreement with arrangements  TH offered empathy, reflective listening, and normalization of challenges to support  P:  CT will follow the financial plans they have created   CT will prioritize self-care needs in new living setting  [facility]     Past Medical History:   Diagnosis Date   • BMI 28 0-28 9,adult 1/17/2020   • Depression    • Dysuria     LAST ASSESSED [id number]   • Migraine     LAST ASSESSED [id number]   • Miscarriage 06/2019   • Ovarian cyst        Past Surgical History:   Procedure Laterality Date   • DILATION AND CURETTAGE OF UTERUS  06/2019       Current Outpatient Medications   Medication Sig Dispense Refill   • buPROPion (WELLBUTRIN) 100 mg tablet TAKE ONE TABLET BY MOUTH DAILY IN THE MORNING 30 tablet 0   • escitalopram (LEXAPRO) 20 mg tablet Take 1 tablet (20 mg total) by mouth daily 30 tablet 2   • lamoTRIgine (LaMICtal) 100 mg tablet Take 100 mg by mouth daily     • nitrofurantoin (MACROBID) 100 mg capsule Take 1 capsule (100 mg total) by mouth 2 (two) times a day (Patient not taking: Reported on 1/2/2022 ) 10 capsule 0   • norgestimate-ethinyl estradiol (ORTHO TRI-CYCLEN,TRINESSA) 0 18/0 215/0 25 MG-35 MCG per tablet      • ondansetron (Zofran ODT) 4 mg disintegrating tablet Take 1 tablet (4 mg total) by mouth every 6 (six) hours as needed for nausea or vomiting (Patient not taking: Reported on 1/2/2022 ) 20 tablet 0     No current facility-administered medications for this visit          No Known Allergies    Review of Systems    Video Exam    There were no vitals filed for this visit      Physical Exam     I spent 47 minutes directly with the patient during this visit   Session: 11:03am-11:50am

## 2022-11-07 ENCOUNTER — TELEMEDICINE (OUTPATIENT)
Dept: BEHAVIORAL/MENTAL HEALTH CLINIC | Facility: CLINIC | Age: 22
End: 2022-11-07

## 2022-11-07 DIAGNOSIS — F41.8 DEPRESSION WITH ANXIETY: Primary | ICD-10-CM

## 2022-11-07 NOTE — PSYCH
Virtual Regular Visit    Verification of patient location:    Patient is located in the following state in which I hold an active license [place]      Assessment/Plan:    Problem List Items Addressed This Visit        Other    Depression with anxiety - Primary          Goals addressed in session: Goal 1          Reason for visit is No chief complaint on file  Encounter provider Franck Hood, [facility]    Provider located at [address] [place] [address] [address]  [address] [place] [address]  [phone]      Recent Visits  No visits were found meeting these conditions  Showing recent visits within past 7 days and meeting all other requirements  Today's Visits  Date Type Provider Dept   11/07/22 Dayna Erickson 1160, [address] [facility] Therapist [facility]   Showing today's visits and meeting all other requirements  Future Appointments  No visits were found meeting these conditions  Showing future appointments within next 150 days and meeting all other requirements       The patient was identified by name and date of birth  Dania Michaud was informed that this is a telemedicine visit and that the visit is being conducted throughthe [employer]ist[employer] platform  She agrees to proceed     My office door was closed  No one else was in the room  She acknowledged consent and understanding of privacy and security of the video platform  The patient has agreed to participate and understands they can discontinue the visit at any time  Patient is aware this is a billable service  Subjective  Dania Michaud is a 20 y o  female   D:  -CT reported that she is very happy with her job: pay, coworkers, and benefits  -CT reported that her HEATHER is now working at her workplace which helps with CT's transportation for work  -CT reported that living at home is going well except for the fact that her father does not like her    Father blames  for loosing their apartment  TH invited CT to describe how her experience of living at home now is for her  CT described feeling triggered at times but that she is aware that her father manages his anger  -TH invited CT to describe her own experience during the time when they were living in apartment and were in danger of eviction  -TH invited CT to explore how she is experiencing the separation from her : insights, plans going forward  A:  CT presented with no SI, HI, self-harm, or DnA challenges  CT mood was good  CT aware of her tendency to put other's needs about her own  Explored CT's ambivalence surrounding this pattern  TH offered empathy, reflective listening, and normalization of challenges to support  P:  CT plans s]to enjoy Thanksgiving with 's family  CT will care for herself and needs for "me time"  L[facility]     Past Medical History:   Diagnosis Date   • BMI 28 0-28 9,adult 1/17/2020   • Depression    • Dysuria     LAST ASSESSED [id number]   • Migraine     LAST ASSESSED [id number]   • Miscarriage 06/2019   • Ovarian cyst        Past Surgical History:   Procedure Laterality Date   • DILATION AND CURETTAGE OF UTERUS  06/2019       Current Outpatient Medications   Medication Sig Dispense Refill   • buPROPion (WELLBUTRIN) 100 mg tablet TAKE ONE TABLET BY MOUTH DAILY IN THE MORNING 30 tablet 0   • escitalopram (LEXAPRO) 20 mg tablet Take 1 tablet (20 mg total) by mouth daily 30 tablet 2   • lamoTRIgine (LaMICtal) 100 mg tablet Take 100 mg by mouth daily     • nitrofurantoin (MACROBID) 100 mg capsule Take 1 capsule (100 mg total) by mouth 2 (two) times a day (Patient not taking: Reported on 1/2/2022 ) 10 capsule 0   • norgestimate-ethinyl estradiol (ORTHO TRI-CYCLEN,TRINESSA) 0 18/0 215/0 25 MG-35 MCG per tablet      • ondansetron (Zofran ODT) 4 mg disintegrating tablet Take 1 tablet (4 mg total) by mouth every 6 (six) hours as needed for nausea or vomiting (Patient not taking: Reported on 1/2/2022 ) 20 tablet 0     No current facility-administered medications for this visit  No Known Allergies    Review of Systems    Video Exam    There were no vitals filed for this visit      Physical Exam     11/07/22  Start Time: 9876  Stop Time: 1506  Total Visit Time: 51 minutes

## 2022-12-01 ENCOUNTER — TELEMEDICINE (OUTPATIENT)
Dept: PSYCHIATRY | Facility: CLINIC | Age: 22
End: 2022-12-01

## 2022-12-01 DIAGNOSIS — F41.8 DEPRESSION WITH ANXIETY: ICD-10-CM

## 2022-12-01 DIAGNOSIS — F31.9 BIPOLAR AFFECTIVE DISORDER, REMISSION STATUS UNSPECIFIED (HCC): Primary | ICD-10-CM

## 2022-12-01 DIAGNOSIS — F41.1 GENERALIZED ANXIETY DISORDER: ICD-10-CM

## 2022-12-01 RX ORDER — ESCITALOPRAM OXALATE 20 MG/1
20 TABLET ORAL DAILY
Qty: 30 TABLET | Refills: 1 | Status: SHIPPED | OUTPATIENT
Start: 2022-12-01

## 2022-12-01 RX ORDER — LAMOTRIGINE 200 MG/1
200 TABLET ORAL DAILY
Qty: 30 TABLET | Refills: 1 | Status: SHIPPED | OUTPATIENT
Start: 2022-12-01

## 2022-12-01 RX ORDER — BUPROPION HYDROCHLORIDE 100 MG/1
TABLET ORAL
Qty: 30 TABLET | Refills: 1 | Status: SHIPPED | OUTPATIENT
Start: 2022-12-01

## 2022-12-01 NOTE — PSYCH
Virtual Regular Visit    Verification of patient location:    Patient is located in the following state in which I [place] an [place]      Assessment/Plan:    Problem List Items Addressed This Visit        Other    Depression with anxiety    Relevant Medications    escitalopram (LEXAPRO) 20 mg tablet    buPROPion (WELLBUTRIN) 100 mg tablet   Other Visit Diagnoses     Bipolar affective disorder, remission status unspecified ([facility] [address] )    -  Primary    Relevant Medications    lamoTRIgine (LaMICtal) 200 MG tablet    escitalopram (LEXAPRO) 20 mg tablet    buPROPion (WELLBUTRIN) 100 mg tablet    Generalized anxiety disorder        Relevant Medications    escitalopram (LEXAPRO) 20 mg tablet    buPROPion (WELLBUTRIN) 100 mg tablet          Goals addressed in session: Goal 1          Reason for visit is   Chief Complaint   Patient presents with   • Medication Management        Encounter provider Donald Tolbert MD    Provider located at [address] [facility]  [address]  [phone]      Recent Visits  No visits were found meeting these conditions  Showing recent visits within past 7 days and meeting all other requirements  Today's Visits  Date Type Provider Dept   12/01/22 Telemedicine Donald Tolbert, [address] [place] [address] today's visits and meeting all other requirements  Future Appointments  No visits were found meeting these conditions  Showing future appointments within next 150 days and meeting all other requirements       The patient was identified by name and date of birth  Arlinda Moritz was informed that this is a telemedicine visit and that [facility]  She agrees to proceed     My office door was closed  No one else was in the room  She acknowledged consent and understanding of privacy and security of the video platform   The patient has agreed to participate and understands they can discontinue the visit at any time  Patient is aware this is a billable service  Subjective  Dania Michaud is a 20 y o  female with mood swings and anxiety presents for regular f/u    Compliant with meds, denies SE  Pt lost apartment and moved back with parents;  is with his parents  Pt continues to work, likes her job        HPI   Mood - increased mood swings and depression; 2 weeks ago - passive SI - sad, hopeless  Still works; does ADLs  Anxiety - increased worries    Past Medical History:   Diagnosis Date   • BMI 28 0-28 9,adult 1/17/2020   • Depression    • Dysuria     LAST ASSESSED [id number]   • Migraine     LAST ASSESSED [id number]   • Miscarriage 06/2019   • Ovarian cyst        Past Surgical History:   Procedure Laterality Date   • DILATION AND CURETTAGE OF UTERUS  06/2019       Current Outpatient Medications   Medication Sig Dispense Refill   • buPROPion (WELLBUTRIN) 100 mg tablet TAKE ONE TABLET BY MOUTH DAILY IN THE MORNING 30 tablet 1   • escitalopram (LEXAPRO) 20 mg tablet Take 1 tablet (20 mg total) by mouth daily 30 tablet 1   • lamoTRIgine (LaMICtal) 200 MG tablet Take 1 tablet (200 mg total) by mouth daily 30 tablet 1   • nitrofurantoin (MACROBID) 100 mg capsule Take 1 capsule (100 mg total) by mouth 2 (two) times a day (Patient not taking: Reported on 1/2/2022 ) 10 capsule 0   • norgestimate-ethinyl estradiol (ORTHO TRI-CYCLEN,TRINESSA) 0 18/0 215/0 25 MG-35 MCG per tablet      • ondansetron (Zofran ODT) 4 mg disintegrating tablet Take 1 tablet (4 mg total) by mouth every 6 (six) hours as needed for nausea or vomiting (Patient not taking: Reported on 1/2/2022 ) 20 tablet 0     No current facility-administered medications for this visit  No Known Allergies    Review of Systems   Constitutional: Negative for activity change and appetite change  Psychiatric/Behavioral: Positive for dysphoric mood  Negative for sleep disturbance and suicidal ideas   The patient is nervous/anxious  Video Exam    There were no vitals filed for this visit  Physical Exam  Constitutional:       Appearance: Normal appearance  She is normal weight  Neurological:      Mental Status: She is alert  Psychiatric:         Attention and Perception: Attention and perception normal          Mood and Affect: Affect normal  Mood is anxious and depressed  Speech: Speech normal          Behavior: Behavior normal  Behavior is cooperative  Thought Content:  Thought content normal          Judgment: Judgment normal           Visit Time    Visit Start Time: 5 39 pm   Visit Stop Time: 5 48 pm   Total Visit Duration: 18 minutes

## 2022-12-05 ENCOUNTER — TELEMEDICINE (OUTPATIENT)
Dept: BEHAVIORAL/MENTAL HEALTH CLINIC | Facility: CLINIC | Age: 22
End: 2022-12-05

## 2022-12-05 DIAGNOSIS — F43.23 ADJUSTMENT DISORDER WITH MIXED ANXIETY AND DEPRESSED MOOD: Primary | ICD-10-CM

## 2022-12-05 NOTE — PSYCH
Virtual Regular Visit    Verification of patient location:    Patient is located in the following state in which I hold an [place] license [place]      Assessment/Plan:    Problem List Items Addressed This Visit        Other    Adjustment disorder with mixed anxiety and depressed mood - Primary       Goals addressed in session: Goal 1          Reason for visit is No chief complaint on file  Encounter provider Janice Gay, [facility] \[facility]\""    Provider located at [address] [place] [address] [address]  [address] [place] [address]  [phone]      Recent Visits  No visits were found meeting these conditions  Showing recent visits within past 7 days and meeting all other requirements  Today's Visits  Date Type Provider Dept   12/05/22 Dayna Erickson 1160, [address] [facility] [address] Therapist [facility]   Showing today's visits and meeting all other requirements  Future Appointments  No visits were found meeting these conditions  Showing future appointments within next 150 days and meeting all other requirements       The patient was identified by name and date of birth  Domenica Becker was informed that this is a telemedicine visit and that the visit is being conducted throughthe DiversityDoctor platform  She agrees to proceed     My office door was closed  No one else was in the room  She acknowledg"With these boundaires, she feels safeed consent and understanding of privacy and security of the video platform  The patient has agreed to participate and understands they can discontinue the visit at any time  Patient is aware this is a billable service  Senora Dakins is a 20 y o  female   D:  -CT reported that she spent the previous day with her  and enjoyed the day together  And now is spending her second day off taking care of herself and important life tasks     -TH invited CT to reflect on how she is managing the living situation separation from her   CT described the pattern/schedule  CT is not happy with the arrangement, but realistic and accepts that this is the way things need to be at this time because of the financial difficulties she and  had  "We are working through the plan and have to be patient"  -CT stated that her father has the power to allow or disallow  visiting her at parent's house  CT stated that she is not fully aware of what needs to be in place in order for   to be able to visit  CT stated that her father wants  to be financially responsible and not take advantage of her  Again, CT not pleased but accepts  -CT reported about her Thanksgiving  CT spent the day with her 's family  CT noted that she is not comfortable spending time with her brother (CT's parents when to brother's house) CT described her relationship with her brother and the healthy boundaries she uses that help her feel safe  CT appreciates that her brother usually respects the her boundaries  -CT stated that she does have a good relationship with her older brother, her 's siblings  -CT talked about feeling very dark for about a week ago  Explored her ways of coping, seeking support to remind her of her value ( and mother) CT reported no self-harm, riding out the week  CT described feeling numb  TH normalized short periods of lower mood and potential for going insight and new awareness  -CT inquired about BPD and was curious if this applies to her  CT is curious after viewing TikTok videos  Reviewed DX criteria (CT meets 4 of 9 characteristics-5 required for dx) Discussed how to proceed with therapy support based on this clarity; TH recommended DBT workbook  CT agreed with conclusion    -Discussed the ways that some of CT's coping patterns were learned as survival coping reactions given some challenges  A:  CT presented with no SI, HI, self-harm, or DnA challenges  CT mood was good   CT is aware of her mood status and growing insight about the core beginnings  Reviewed MHdx and ruled out BPD at this time  Acknowledged trauma hx  Recommend DBT workbook to further build coping  TH offered empathy, reflective listening, and normalization of challenges to support  P:  CT will seek feedback from supports when experiencing lower mood  CT will continue with existing routine and keep healthy boundaries with brother  Karrie Nissen HPI     Past Medical History:   Diagnosis Date   • BMI 28 0-28 9,adult 1/17/2020   • Depression    • Dysuria     LAST ASSESSED [id number]   • Migraine     LAST ASSESSED [id number]   • Miscarriage 06/2019   • Ovarian cyst        Past Surgical History:   Procedure Laterality Date   • DILATION AND CURETTAGE OF UTERUS  06/2019       Current Outpatient Medications   Medication Sig Dispense Refill   • buPROPion (WELLBUTRIN) 100 mg tablet TAKE ONE TABLET BY MOUTH DAILY IN THE MORNING 30 tablet 1   • escitalopram (LEXAPRO) 20 mg tablet Take 1 tablet (20 mg total) by mouth daily 30 tablet 1   • lamoTRIgine (LaMICtal) 200 MG tablet Take 1 tablet (200 mg total) by mouth daily 30 tablet 1   • nitrofurantoin (MACROBID) 100 mg capsule Take 1 capsule (100 mg total) by mouth 2 (two) times a day (Patient not taking: Reported on 1/2/2022 ) 10 capsule 0   • norgestimate-ethinyl estradiol (ORTHO TRI-CYCLEN,TRINESSA) 0 18/0 215/0 25 MG-35 MCG per tablet      • ondansetron (Zofran ODT) 4 mg disintegrating tablet Take 1 tablet (4 mg total) by mouth every 6 (six) hours as needed for nausea or vomiting (Patient not taking: Reported on 1/2/2022 ) 20 tablet 0     No current facility-administered medications for this visit  No Known Allergies    Review of Systems    Video Exam    There were no vitals filed for this visit      Physical Exam     12/05/22  Start Time: 5801  Stop Time: 0154  Total Visit Time: 66 minutes

## 2022-12-22 ENCOUNTER — TELEMEDICINE (OUTPATIENT)
Dept: PSYCHIATRY | Facility: CLINIC | Age: 22
End: 2022-12-22

## 2022-12-22 DIAGNOSIS — F41.8 DEPRESSION WITH ANXIETY: ICD-10-CM

## 2022-12-22 DIAGNOSIS — F41.1 GENERALIZED ANXIETY DISORDER: ICD-10-CM

## 2022-12-22 DIAGNOSIS — F31.9 BIPOLAR AFFECTIVE DISORDER, REMISSION STATUS UNSPECIFIED (HCC): Primary | ICD-10-CM

## 2022-12-22 RX ORDER — BUPROPION HYDROCHLORIDE 100 MG/1
TABLET ORAL
Qty: 90 TABLET | Refills: 0 | Status: SHIPPED | OUTPATIENT
Start: 2022-12-22

## 2022-12-22 RX ORDER — ESCITALOPRAM OXALATE 20 MG/1
20 TABLET ORAL DAILY
Qty: 90 TABLET | Refills: 0 | Status: SHIPPED | OUTPATIENT
Start: 2022-12-22

## 2022-12-22 RX ORDER — LAMOTRIGINE 200 MG/1
200 TABLET ORAL DAILY
Qty: 90 TABLET | Refills: 0 | Status: SHIPPED | OUTPATIENT
Start: 2022-12-22

## 2022-12-22 NOTE — PSYCH
Virtual Regular Visit    Verification of patient location:    Patient is located in the following state in which [place] an [place]      Assessment/Plan:    Problem List Items Addressed This Visit        Other    Depression with anxiety    Relevant Medications    escitalopram (LEXAPRO) 20 mg tablet    buPROPion (WELLBUTRIN) 100 mg tablet   Other Visit Diagnoses     Bipolar affective disorder, remission status unspecified ([facility] [address] )    -  Primary    Relevant Medications    lamoTRIgine (LaMICtal) 200 MG tablet    escitalopram (LEXAPRO) 20 mg tablet    buPROPion (WELLBUTRIN) 100 mg tablet    Generalized anxiety disorder        Relevant Medications    escitalopram (LEXAPRO) 20 mg tablet    buPROPion (WELLBUTRIN) 100 mg tablet          Goals addressed in session: Goal 1          Reason for visit is   Chief Complaint   Patient presents with   • Medication Management        Encounter provider Isha Blanco MD    Provider located at [address] [facility]  [address]  [phone]      Recent Visits  No visits were found meeting these conditions  Showing recent visits within past 7 days and meeting all other requirements  Today's Visits  Date Type Provider Dept   12/22/22 Telemedicine Isha Blanco, [address] [place] [address] today's visits and meeting all other requirements  Future Appointments  No visits were found meeting these conditions  Showing future appointments within next 150 days and meeting all other requirements       The patient was identified by name and date of birth  Doe Love was informed that this is a telemedicine visit and that the [facility]  She agrees to proceed     My office door was closed  No one else was in the room  She acknowledged consent and understanding of privacy and security of the video platform   The patient has agreed to participate and understands they can discontinue the visit at any time  Patient is aware this is a billable service  Subjective  Sylvia Ferrell is a 20 y o  female with mood swings and anxiety presents for regular f/u    Last appt lamictal was increased with good effect  Denies medical problems  Works; lives with parents      HPI   Mod - improved - less mood swings, more energy and more active  Social; functions close to baseline  Anxiety - worries and racing thoughts =sometimes    Past Medical History:   Diagnosis Date   • BMI 28 0-28 9,adult 1/17/2020   • Depression    • Dysuria     LAST ASSESSED [id number]   • Migraine     LAST ASSESSED [id number]   • Miscarriage 06/2019   • Ovarian cyst        Past Surgical History:   Procedure Laterality Date   • DILATION AND CURETTAGE OF UTERUS  06/2019       Current Outpatient Medications   Medication Sig Dispense Refill   • buPROPion (WELLBUTRIN) 100 mg tablet TAKE ONE TABLET BY MOUTH DAILY IN THE MORNING 90 tablet 0   • escitalopram (LEXAPRO) 20 mg tablet Take 1 tablet (20 mg total) by mouth daily 90 tablet 0   • lamoTRIgine (LaMICtal) 200 MG tablet Take 1 tablet (200 mg total) by mouth daily 90 tablet 0   • nitrofurantoin (MACROBID) 100 mg capsule Take 1 capsule (100 mg total) by mouth 2 (two) times a day (Patient not taking: Reported on 1/2/2022 ) 10 capsule 0   • norgestimate-ethinyl estradiol (ORTHO TRI-CYCLEN,TRINESSA) 0 18/0 215/0 25 MG-35 MCG per tablet      • ondansetron (Zofran ODT) 4 mg disintegrating tablet Take 1 tablet (4 mg total) by mouth every 6 (six) hours as needed for nausea or vomiting (Patient not taking: Reported on 1/2/2022 ) 20 tablet 0     No current facility-administered medications for this visit  No Known Allergies    Review of Systems   Constitutional: Positive for appetite change  Negative for activity change  Psychiatric/Behavioral: Negative for sleep disturbance and suicidal ideas         Video Exam    There were no vitals filed for this visit     Physical Exam  Constitutional:       Appearance: Normal appearance  She is normal weight  Neurological:      Mental Status: She is alert  Psychiatric:         Attention and Perception: Attention and perception normal          Mood and Affect: Mood and affect normal          Speech: Speech normal          Behavior: Behavior normal  Behavior is cooperative  Thought Content:  Thought content normal          Judgment: Judgment normal           Visit Time    Visit Start Time: 5 40 pm   Visit Stop Time: 5 45 pm   Total Visit Duration: 15 minutes

## 2023-01-09 ENCOUNTER — TELEMEDICINE (OUTPATIENT)
Dept: BEHAVIORAL/MENTAL HEALTH CLINIC | Facility: CLINIC | Age: 23
End: 2023-01-09

## 2023-01-09 DIAGNOSIS — F41.8 DEPRESSION WITH ANXIETY: Primary | ICD-10-CM

## 2023-01-09 DIAGNOSIS — F43.23 ADJUSTMENT DISORDER WITH MIXED ANXIETY AND DEPRESSED MOOD: ICD-10-CM

## 2023-01-09 NOTE — PSYCH
Virtual Regular Visit    Verification of patient location:    Patient is located in the following state in which I hold an active license [place]      Assessment/Plan:    Problem List Items Addressed This Visit        Other    Depression with anxiety - Primary    Adjustment disorder with mixed anxiety and depressed mood       Goals addressed in session: Goal 1          Reason for visit is No chief complaint on file  Encounter provider Francisco Lechuga, [facility]    Provider located at [address] [place] [address]  [phone]      Recent Visits  No visits were found meeting these conditions  Showing recent visits within past 7 days and meeting all other requirements  Today's Visits  Date Type Provider Dept   01/09/23 Telemedicine Francisco Lechuga, [facility]   Showing today's visits and meeting all other requirements  Future Appointments  No visits were found meeting these conditions  Showing future appointments within next 150 days and meeting all other requirements       The patient was identified by name and date of birth  Paris Diggs was informed that this is a telemedicine visit and that the visit is being conducted throughthe TopPatch platform  She agrees to proceed     My office door was closed  No one else was in the room  She acknowledged consent and understanding of privacy and security of the video platform  The patient has agreed to participate and understands they can discontinue the visit at any time  Patient is aware this is a billable service  Subjective  Paris Diggs is a 20 y o  female   D:  -Reviewed and updated crisis plan  PHQ9=5  CT confirmed "no concerns about mood  I have good and and bad days   mostly good and don't worry when I have a hard day "   -CT reported that she is recovering from a cold, feeling better, but having difficulties speaking (keyla voice)  -CT reported that her brother was home visiting for the past week  CT enjoyed the time and visiting with him  "I feel aligned with him, we like the same things"  -CT talked about her interest in astrology and tarot cards  Invited CT to reflect on the ways this effects her  CT discussed her desire to stay in in a positive energy space with awareness of the way brings positive energy in her life  CT also stated that she is aware of occasional days when energy is negative and aware that these pass and not the majority of her days are positive  -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 and the difficulties of this atmosphere and the negative energy of the workers  CT reflected that this was very challenging for her MH (increased anxiety a great deal)  CT processed several very difficult days when patients  and she needed to respond/support  CT very pleased with job now: positive energy, supportive co-workers, reasonable expectations from management  -Reviewed CT goals going forward in therapy  CT stated that she wants to continue to grow confidence and not be bothered/worried about what others think  "stop ruminating about what others say, quit doubting myself"  Reviewed CT's positive movement and stable mood  Discussed movement toward discharge  Agreed to consider and discuss at next session  A:  CT presented with no SI, HI, self-harm, or DnA challenges  CT mood was good  CT aware of ups and downs as normal part of life and is not remaining in low mood  TH offered empathy, reflective listening, and normalization of challenges to support  P:  CT will review Self-love workbook materials and consider using this independently  Consider d/c   (tx plan review and update)            HPI     Past Medical History:   Diagnosis Date   • BMI 28 0-28 9,adult 2020   • Depression    • Dysuria     LAST ASSESSED [id number]   • Migraine     LAST ASSESSED [id number]   • Miscarriage 2019   • Ovarian cyst        Past Surgical History:   Procedure Laterality Date   • DILATION AND CURETTAGE OF UTERUS  06/2019       Current Outpatient Medications   Medication Sig Dispense Refill   • buPROPion (WELLBUTRIN) 100 mg tablet TAKE ONE TABLET BY MOUTH DAILY IN THE MORNING 90 tablet 0   • escitalopram (LEXAPRO) 20 mg tablet Take 1 tablet (20 mg total) by mouth daily 90 tablet 0   • lamoTRIgine (LaMICtal) 200 MG tablet Take 1 tablet (200 mg total) by mouth daily 90 tablet 0   • nitrofurantoin (MACROBID) 100 mg capsule Take 1 capsule (100 mg total) by mouth 2 (two) times a day (Patient not taking: Reported on 1/2/2022 ) 10 capsule 0   • norgestimate-ethinyl estradiol (ORTHO TRI-CYCLEN,TRINESSA) 0 18/0 215/0 25 MG-35 MCG per tablet      • ondansetron (Zofran ODT) 4 mg disintegrating tablet Take 1 tablet (4 mg total) by mouth every 6 (six) hours as needed for nausea or vomiting (Patient not taking: Reported on 1/2/2022 ) 20 tablet 0     No current facility-administered medications for this visit  No Known Allergies    Review of Systems    Video Exam    There were no vitals filed for this visit      Physical Exam     01/09/23  Start Time: 5580  Stop Time: 1200  Total Visit Time: 57 minutes

## 2023-02-13 ENCOUNTER — TELEMEDICINE (OUTPATIENT)
Dept: BEHAVIORAL/MENTAL HEALTH CLINIC | Facility: CLINIC | Age: 23
End: 2023-02-13

## 2023-02-13 DIAGNOSIS — F31.73 BIPOLAR DISORDER, IN PARTIAL REMISSION, MOST RECENT EPISODE MANIC (HCC): Primary | ICD-10-CM

## 2023-02-13 NOTE — PSYCH
Behavioral Health Psychotherapy Progress Note    Psychotherapy Provided: Individual Psychotherapy     1  Bipolar disorder, in partial remission, most recent episode manic ([address] )            Goals addressed in session: Goal 1 and Goal 2     DATA:      -CT reported "doing well overall"  -CT wanted to discuss and explore her diagnosis, BPD vs Bipolar  CT noted that she is aware that similarities exist betweeen the 2 dx  -TH invited CT to complete Mood Disorder Questionnaire  CT results positive for bipolar dx  TH noted biggest difference between bipolar and BPD is ability to establish and maintain relationships  CT noted several good, long-standing relationships  Additionally noted was CT's ability to and tendency to assume responsibility for interpersonal difficulties  -CT has been stable with less labile mood with Lamictal treatment  -CT described her current mood as "more manic" with less sleep required (4 hours/night), desire to make impulsive purchases (unable because mother has debit card voluntarily form CT), and experiencing extra energy and rapid speech  CT feels radha is controlled and she is not concerned  TH recommended consult with psych if continues or intensifies     -Discussed the purpose of mood stabilizer is to put limits/boundaries around the highs and lows  While radha is more comfortable, it is almost always followed by lower mood which is undesirable  The higher the high, the lower the low  -CT completed PHQ9(score=11); high frequency of sleep disturbances (falling asleep), and high activity levels  Results congruent with reported hypomanic swing  During this session, this clinician used the following therapeutic modalities: Supportive Psychotherapy    Substance Abuse was not addressed during this session  If the client is diagnosed with a co-occurring substance use disorder, please indicate any changes in the frequency or amount of use:    Stage of change for addressing substance use diagnoses: No substance use/Not applicable    ASSESSMENT:  Deloria Cabot presents with a Euthymic/ normal mood  her affect is Normal range and intensity and Bright, which is congruent, with her mood and the content of the session  The client has made progress on their goals  Deloria Cabot presents with a none risk of suicide, none risk of self-harm, and none risk of harm to others  For any risk assessment that surpasses a "low" rating, a safety plan must be developed  A safety plan was indicated: no  If yes, describe in detail   monitor  PLAN: Between sessions, Deloria Cabot will monitor her hypomanic mood and see psych consult if needed, maintain boundary support from mother regarding spending, and avoid making any big decisions impulsively (give a few days to week prior)   At the next session, the therapist will use Supportive Psychotherapy to address mood tracking and clarify healthy tools to manage mood and prevent adverse effects       Behavioral Health Treatment Plan and Discharge Planning: Deloria Cabot is aware of and agrees to continue to work on their treatment plan  They have identified and are working toward their discharge goals   yes    Visit start and stop times:    02/13/23  Start Time: 1108  Stop Time: 1201  Total Visit Time: 53 minutes    Virtual Regular Visit    Verification of patient location:    Patient is located in the following state in which I hold an active license [place]      Assessment/Plan:    Problem List Items Addressed This Visit    None  Visit Diagnoses     Bipolar disorder, in partial remission, most recent episode manic ([facility] [address] )    -  Primary          Goals addressed in session: Goal 1          Reason for visit is   Chief Complaint   Patient presents with   • Virtual Regular Visit        Encounter provider Elza Drummond, [facility]    Provider located at [address] [place] [address] [address] [place] [id number]  [phone]      Recent Visits  No visits were found meeting these conditions  Showing recent visits within past 7 days and meeting all other requirements  Today's Visits  Date Type Provider Dept   02/13/23 Telemedicine Rodo Vivian, [facility]   Showing today's visits and meeting all other requirements  Future Appointments  No visits were found meeting these conditions  Showing future appointments within next 150 days and meeting all other requirements       The patient was identified by name and date of birth  Romario Weiss was informed that this is a telemedicine visit and that the visit is being conducted throughthe LilaKutu platform  She agrees to proceed     My office door was closed  No one else was in the room  She acknowledged consent and understanding of privacy and security of the video platform  The patient has agreed to participate and understands they can discontinue the visit at any time  Patient is aware this is a billable service  Subjective  Romario Weiss is a 20 y o  female          HPI     Past Medical History:   Diagnosis Date   • BMI 28 0-28 9,adult 1/17/2020   • Depression    • Dysuria     LAST ASSESSED [id number]   • Migraine     LAST ASSESSED [id number]   • Miscarriage 06/2019   • Ovarian cyst        Past Surgical History:   Procedure Laterality Date   • DILATION AND CURETTAGE OF UTERUS  06/2019       Current Outpatient Medications   Medication Sig Dispense Refill   • buPROPion (WELLBUTRIN) 100 mg tablet TAKE ONE TABLET BY MOUTH DAILY IN THE MORNING 90 tablet 0   • escitalopram (LEXAPRO) 20 mg tablet Take 1 tablet (20 mg total) by mouth daily 90 tablet 0   • lamoTRIgine (LaMICtal) 200 MG tablet Take 1 tablet (200 mg total) by mouth daily 90 tablet 0   • nitrofurantoin (MACROBID) 100 mg capsule Take 1 capsule (100 mg total) by mouth 2 (two) times a day (Patient not taking: Reported on 1/2/2022 ) 10 capsule 0   • norgestimate-ethinyl estradiol (ORTHO TRI-CYCLEN,TRINESSA) 0 18/0 215/0 25 MG-35 MCG per tablet      • ondansetron (Zofran ODT) 4 mg disintegrating tablet Take 1 tablet (4 mg total) by mouth every 6 (six) hours as needed for nausea or vomiting (Patient not taking: Reported on 1/2/2022 ) 20 tablet 0     No current facility-administered medications for this visit  No Known Allergies    Review of Systems    Video Exam    There were no vitals filed for this visit      Physical Exam

## 2023-02-14 NOTE — PSYCH
Behavioral Health Psychotherapy Progress Note    Psychotherapy Provided: Individual Psychotherapy     1  Bipolar disorder, in partial remission, most recent episode manic ([address] )            Goals addressed in session: Goal 1     DATA:     -CT reported "doing well overall"  -CT wanted to discuss and explore her diagnosis, BPD vs Bipolar  CT noted that she is aware that similarities exist betweeen the 2 dx  -TH invited CT to complete Mood Disorder Questionnaire  CT results positive for bipolar dx  TH noted biggest difference between bipolar and BPD is ability to establish and maintain relationships  CT noted several good, long-standing relationships  Additionally noted was CT's ability to and tendency to assume responsibility for interpersonal difficulties  -CT has been stable with less labile mood with Lamictal treatment  -CT described her current mood as "more manic" with less sleep required (4 hours/night), desire to make impulsive purchases (unable because mother has debit card voluntarily form CT), and experiencing extra energy and rapid speech  CT feels radha is controlled and she is not concerned  TH recommended consult with psych if continues or intensifies     -Discussed the purpose of mood stabilizer is to put limits/boundaries around the highs and lows  While radha is more comfortable, it is almost always followed by lower mood which is undesirable  The higher the high, the lower the low  -CT completed PHQ9(score=11); high frequency of sleep disturbances (falling asleep), and high activity levels  Results congruent with reported hypomanic swing  During this session, this clinician used the following therapeutic modalities: Client-centered Therapy and Supportive Psychotherapy    Substance Abuse {Was/was not:77805} addressed during this session  If the client is diagnosed with a co-occurring substance use disorder, please indicate any changes in the frequency or amount of use: ***   Stage of change for addressing substance use diagnoses: {Psych Substance Abuse Stage of Change:88912}    ASSESSMENT:  Horace Kim presents with a {Psych/BH Mood:19452::"Euthymic/ normal"} mood  her affect is {Psych/BH Affect:95459::"Normal range and intensity"}, which is {Congruent/Non Congruent:53004}, with her mood and the content of the session  The client {HAS/HAS NOT:22699} made progress on their goals  *** Horace Kim presents with a {PSYCH Suicide/Homicide Risk:40723} risk of suicide, {PSYCH Suicide/Homicide Risk:03170} risk of self-harm, and {PSYCH Suicide/Homicide Risk:12673} risk of harm to others  For any risk assessment that surpasses a "low" rating, a safety plan must be developed  A safety plan was indicated: {YES/NO:20200}  If yes, describe in detail ***    PLAN: Between sessions, Horace Kim will ***  At the next session, the therapist will use {Therapeutic Modalities:96674} to address ***  Behavioral Health Treatment Plan and Discharge Planning: Horace Kmi is aware of and agrees to continue to work on their treatment plan  They have identified and are working toward their discharge goals   {YES/NO:20200}    Visit start and stop times:    02/14/23  Start Time: 1108  Stop Time: 1201  Total Visit Time: 53 minutes    Virtual Regular Visit    Verification of patient location:    Patient is located in the following state in which I hold an active license {[facility] virtual patient location:[address]}      Assessment/Plan:    Problem List Items Addressed This Visit    None  Visit Diagnoses     Bipolar disorder, in partial remission, most recent episode manic ([facility] [address] )    -  Primary          Goals addressed in session: {GOALS:82574}          Reason for visit is   Chief Complaint   Patient presents with   • Virtual Regular Visit        [facility]    Provider located at [address] [place] [address]  [address]  [address] [place] [id number] [phone]      Recent Visits  Date Type Provider Dept   02/13/23 Telemedicine Jade Morrow, [facility] Therapist [facility]   Showing recent visits within past 7 days and meeting all other requirements  Future Appointments  No visits were found meeting these conditions  Showing future appointments within next 150 days and meeting all other requirements       The patient was identified by name and date of birth  Gris Alva was informed that this is a telemedicine visit and that the visit is being conducted through{AMB VIRTUAL VISIT IFZWSS:59854}  {Telemedicine confidentiality :41507} {Telemedicine participants:21581}  She acknowledged consent and understanding of privacy and security of the video platform  The patient has agreed to participate and understands they can discontinue the visit at any time  Patient is aware this is a billable service  Subjective  Gris Alva is a 20 y o  female ***         HPI     Past Medical History:   Diagnosis Date   • BMI 28 0-28 9,adult 1/17/2020   • Depression    • Dysuria     LAST ASSESSED [id number]   • Migraine     LAST ASSESSED [id number]   • Miscarriage 06/2019   • Ovarian cyst        Past Surgical History:   Procedure Laterality Date   • DILATION AND CURETTAGE OF UTERUS  06/2019       Current Outpatient Medications   Medication Sig Dispense Refill   • buPROPion (WELLBUTRIN) 100 mg tablet TAKE ONE TABLET BY MOUTH DAILY IN THE MORNING 90 tablet 0   • escitalopram (LEXAPRO) 20 mg tablet Take 1 tablet (20 mg total) by mouth daily 90 tablet 0   • lamoTRIgine (LaMICtal) 200 MG tablet Take 1 tablet (200 mg total) by mouth daily 90 tablet 0   • nitrofurantoin (MACROBID) 100 mg capsule Take 1 capsule (100 mg total) by mouth 2 (two) times a day (Patient not taking: Reported on 1/2/2022 ) 10 capsule 0   • norgestimate-ethinyl estradiol (ORTHO TRI-CYCLEN,TRINESSA) 0 18/0 215/0 25 MG-35 MCG per tablet      • ondansetron (Zofran ODT) 4 mg disintegrating tablet Take 1 tablet (4 mg total) by mouth every 6 (six) hours as needed for nausea or vomiting (Patient not taking: Reported on 1/2/2022 ) 20 tablet 0     No current facility-administered medications for this visit  No Known Allergies    Review of Systems    Video Exam    There were no vitals filed for this visit      Physical Exam     Visit Time    Visit Start Time: ***  Visit Stop Time: ***  Total Visit Duration: {Psych Total Visit Time:35224}

## 2023-02-22 DIAGNOSIS — F41.8 DEPRESSION WITH ANXIETY: ICD-10-CM

## 2023-02-22 RX ORDER — BUPROPION HYDROCHLORIDE 100 MG/1
TABLET ORAL
Qty: 30 TABLET | Refills: 0 | Status: SHIPPED | OUTPATIENT
Start: 2023-02-22

## 2023-03-06 ENCOUNTER — TELEMEDICINE (OUTPATIENT)
Dept: BEHAVIORAL/MENTAL HEALTH CLINIC | Facility: CLINIC | Age: 23
End: 2023-03-06

## 2023-03-06 DIAGNOSIS — F43.23 ADJUSTMENT DISORDER WITH MIXED ANXIETY AND DEPRESSED MOOD: ICD-10-CM

## 2023-03-06 DIAGNOSIS — F31.73 BIPOLAR DISORDER, IN PARTIAL REMISSION, MOST RECENT EPISODE MANIC (HCC): Primary | ICD-10-CM

## 2023-03-06 NOTE — PSYCH
Behavioral Health Psychotherapy Progress Note    Psychotherapy Provided: Individual Psychotherapy     1  Bipolar disorder, in partial remission, most recent episode manic ([address] )        2  Adjustment disorder with mixed anxiety and depressed mood            Goals addressed in session: Goal 1     DATA:     -CT reported that she feels as if her mood remains in the hypo manic phase  CT reported that she has noticed that she is tempted to stay up later than she should but has been making sure she rests and this has worked well  CT also has kept limits on her spending  CT's mother still holds her debit card  CT is in agreement with this, it works well to keep limits on CT's spending  -CT reported that she went to a gathering with friends (2X a month) and a work outing  CT reflected on enjoying this, knowing her limits and needs to also spend time alone  -CT reported that she and  see each other on Sundays (at this home and hers now too)   -[address] invited CT to reflect on internal effects of mood fluctuations  CT noted desire to have fewer outbursts  TH invited CT to describe and reflect on underlying sources of frustration that may be fueling outbursts  Discussed attention of WOT when addressing frustrations and potential value of addressing concerns vs avoiding these  Reviewed journaling as a highly effective tool to gain clarity and prep for communication  CT noted wanting to journal more regularly  CT saw a version with prompts that she may purchase and use  During this session, this clinician used the following therapeutic modalities: Supportive Psychotherapy    Substance Abuse was not addressed during this session  If the client is diagnosed with a co-occurring substance use disorder, please indicate any changes in the frequency or amount of use:   Stage of change for addressing substance use diagnoses: No substance use/Not applicable    ASSESSMENT:  Sylvia Ferrell presents with a Euthymic/ normal mood       her affect is Normal range and intensity, which is congruent, with her mood and the content of the session  The client has made progress on their goals  Rhea Baez presents with a none risk of suicide, none risk of self-harm, and none risk of harm to others  For any risk assessment that surpasses a "low" rating, a safety plan must be developed  A safety plan was indicated: no  If yes, describe in detail      PLAN: Between sessions, Rhea Baez will research a journal structure/tool that she is interested in and/or do open journaling to grow her self-awareness and provided a check in tool    At the next session, the therapist will use Supportive Psychotherapy to provide support of CT process (increased self-awareness and prioritizing her own needs)   Behavioral Health Treatment Plan and Discharge Planning: Rhea Baez is aware of and agrees to continue to work on their treatment plan  They have identified and are working toward their discharge goals  yes    Visit start and stop times:    03/06/23  Start Time: 1006  Stop Time: 1056  Total Visit Time: 50 minutes    Virtual Regular Visit    Verification of patient location:    Patient is located in the following state in which I hold an active license [place]      Assessment/Plan:    Problem List Items Addressed This Visit        Other    Adjustment disorder with mixed anxiety and depressed mood   Other Visit Diagnoses     Bipolar disorder, in partial remission, most recent episode manic ([facility] [address] )    -  Primary          Goals addressed in session: Goal 1          Reason for visit is   Chief Complaint   Patient presents with   • Virtual Regular Visit        Encounter provider Laney Coleman, [facility]    Provider located at [address] [place] [address] [address] [place] [address]  [phone]      Recent Visits  No visits were found meeting these conditions    Showing recent visits within past 7 days and meeting all other requirements  Today's Visits  Date Type Provider Dept   03/06/23 Dayna Erickson 1160, [address] Therapist [facility]   Showing today's visits and meeting all other requirements  Future Appointments  No visits were found meeting these conditions  Showing future appointments within next 150 days and meeting all other requirements       The patient was identified by name and date of birth  Jack Sorto was informed that this is a telemedicine visit and that the visit is being conducted throughthe [employer] platform  She agrees to proceed     My office door was closed  No one else was in the room  She acknowledged consent and understanding of privacy and security of the video platform  The patient has agreed to participate and understands they can discontinue the visit at any time  Patient is aware this is a billable service  Subjective  Jack Sorto is a 24 y o  female          HPI     Past Medical History:   Diagnosis Date   • BMI 28 0-28 9,adult 1/17/2020   • Depression    • Dysuria     LAST ASSESSED [id number]   • Migraine     LAST ASSESSED [id number]   • Miscarriage 06/2019   • Ovarian cyst        Past Surgical History:   Procedure Laterality Date   • DILATION AND CURETTAGE OF UTERUS  06/2019       Current Outpatient Medications   Medication Sig Dispense Refill   • buPROPion (WELLBUTRIN) 100 mg tablet TAKE ONE TABLET BY MOUTH DAILY IN THE MORNING 30 tablet 0   • escitalopram (LEXAPRO) 20 mg tablet Take 1 tablet (20 mg total) by mouth daily 90 tablet 0   • lamoTRIgine (LaMICtal) 200 MG tablet Take 1 tablet (200 mg total) by mouth daily 90 tablet 0   • nitrofurantoin (MACROBID) 100 mg capsule Take 1 capsule (100 mg total) by mouth 2 (two) times a day (Patient not taking: Reported on 1/2/2022 ) 10 capsule 0   • norgestimate-ethinyl estradiol (ORTHO TRI-CYCLEN,TRINESSA) 0 18/0 215/0 25 MG-35 MCG per tablet      • ondansetron (Zofran ODT) 4 mg disintegrating tablet Take 1 tablet (4 mg total) by mouth every 6 (six) hours as needed for nausea or vomiting (Patient not taking: Reported on 1/2/2022 ) 20 tablet 0     No current facility-administered medications for this visit  No Known Allergies    Review of Systems    Video Exam    There were no vitals filed for this visit      Physical Exam

## 2023-03-16 ENCOUNTER — TELEMEDICINE (OUTPATIENT)
Dept: PSYCHIATRY | Facility: CLINIC | Age: 23
End: 2023-03-16

## 2023-03-16 DIAGNOSIS — F31.9 BIPOLAR AFFECTIVE DISORDER, REMISSION STATUS UNSPECIFIED (HCC): Primary | ICD-10-CM

## 2023-03-16 DIAGNOSIS — F41.8 DEPRESSION WITH ANXIETY: ICD-10-CM

## 2023-03-16 DIAGNOSIS — F41.1 GENERALIZED ANXIETY DISORDER: ICD-10-CM

## 2023-03-16 RX ORDER — LAMOTRIGINE 200 MG/1
200 TABLET ORAL DAILY
Qty: 90 TABLET | Refills: 0 | Status: SHIPPED | OUTPATIENT
Start: 2023-03-16

## 2023-03-16 RX ORDER — ESCITALOPRAM OXALATE 10 MG/1
10 TABLET ORAL DAILY
Qty: 30 TABLET | Refills: 1 | Status: SHIPPED | OUTPATIENT
Start: 2023-03-16

## 2023-03-16 RX ORDER — BUPROPION HYDROCHLORIDE 100 MG/1
TABLET ORAL
Qty: 90 TABLET | Refills: 0 | Status: SHIPPED | OUTPATIENT
Start: 2023-03-16

## 2023-03-16 NOTE — PSYCH
Virtual Regular Visit    Verification of patient location:    Patient is located in the following state in which I hold an active license [place]      Assessment/Plan:    Problem List Items Addressed This Visit    None      Goals addressed in session: Goal 1          Reason for visit is   Chief Complaint   Patient presents with   • Medication Management        Encounter provider Kevin Bland MD    Provider located at [address] [facility] [address]  [address] [facility]  [address]  [phone]      Recent Visits  No visits were found meeting these conditions  Showing recent visits within past 7 days and meeting all other requirements  Today's Visits  Date Type Provider Dept   23 Telemedicine Kevin Bland, [address] [address] today's visits and meeting all other requirements  Future Appointments  No visits were found meeting these conditions  Showing future appointments within next 150 days and meeting all other requirements       The patient was identified by name and date of birth  Efrain Bryant was informed that this is a telemedicine visit and that the visit is being conducted throughthe [employer]  She agrees to proceed     My office door was closed  No one else was in the room  She acknowledged consent and understanding of privacy and security of the video platform  The patient has agreed to participate and understands they can discontinue the visit at any time  Patient is aware this is a billable service  Subjective  Efrain Bryant is a 24 y o  female with mood swings, anxiety presents for regular f/u      Complaint with meds, barbara BONILLA  Works, lives with parents; good relationship with   Anniversary - cat  last year      HPI   Mood - since January - worse - daily mood swings - increased energy and elevated mood, fast and loud speech , racing thoughts and jittery - in the morning; by the evening becomes calmer, tired, " down"  More emotional during period  episodes of irritability , " snapping"  Anxiety - increased racing thoughts and worries  Poor sleep - stays up late sometimes    Past Medical History:   Diagnosis Date   • BMI 28 0-28 9,adult 1/17/2020   • Depression    • Dysuria     LAST ASSESSED [id number]   • Migraine     LAST ASSESSED [id number]   • Miscarriage 06/2019   • Ovarian cyst        Past Surgical History:   Procedure Laterality Date   • DILATION AND CURETTAGE OF UTERUS  06/2019       Current Outpatient Medications   Medication Sig Dispense Refill   • buPROPion (WELLBUTRIN) 100 mg tablet TAKE ONE TABLET BY MOUTH DAILY IN THE MORNING 30 tablet 0   • escitalopram (LEXAPRO) 20 mg tablet Take 1 tablet (20 mg total) by mouth daily 90 tablet 0   • lamoTRIgine (LaMICtal) 200 MG tablet Take 1 tablet (200 mg total) by mouth daily 90 tablet 0   • nitrofurantoin (MACROBID) 100 mg capsule Take 1 capsule (100 mg total) by mouth 2 (two) times a day (Patient not taking: Reported on 1/2/2022 ) 10 capsule 0   • norgestimate-ethinyl estradiol (ORTHO TRI-CYCLEN,TRINESSA) 0 18/0 215/0 25 MG-35 MCG per tablet      • ondansetron (Zofran ODT) 4 mg disintegrating tablet Take 1 tablet (4 mg total) by mouth every 6 (six) hours as needed for nausea or vomiting (Patient not taking: Reported on 1/2/2022 ) 20 tablet 0     No current facility-administered medications for this visit  No Known Allergies    Review of Systems   Constitutional: Negative for activity change and appetite change  Psychiatric/Behavioral: Positive for dysphoric mood and sleep disturbance (poor sleep)  Negative for suicidal ideas  The patient is nervous/anxious  Video Exam    There were no vitals filed for this visit  Physical Exam  Constitutional:       Appearance: Normal appearance  She is normal weight  Neurological:      Mental Status: She is alert     Psychiatric:         Attention and Perception: Attention and perception normal          Mood and Affect: Affect normal  Mood is anxious and depressed  Speech: Speech normal          Behavior: Behavior normal  Behavior is cooperative  Thought Content:  Thought content normal          Judgment: Judgment normal           Visit Time    Visit Start Time: 5 59 pm   Visit Stop Time: 6 12 pm   Total Visit Duration: 20 minutes

## 2023-03-16 NOTE — PATIENT INSTRUCTIONS
Decrease lexapro 10 mg   Continue lamictal and wellbutrin  Option of abilify d/w pt - pt declined b/o possible SE weight gain  Continue therapy

## 2023-03-20 ENCOUNTER — TELEMEDICINE (OUTPATIENT)
Dept: BEHAVIORAL/MENTAL HEALTH CLINIC | Facility: CLINIC | Age: 23
End: 2023-03-20

## 2023-03-20 DIAGNOSIS — F31.73 BIPOLAR DISORDER, IN PARTIAL REMISSION, MOST RECENT EPISODE MANIC (HCC): Primary | ICD-10-CM

## 2023-03-20 NOTE — PSYCH
Behavioral Health Psychotherapy Progress Note    Psychotherapy Provided: Individual Psychotherapy     1  Bipolar disorder, in partial remission, most recent episode manic ([address] )            Goals addressed in session: Goal 1     DATA:     -CT reported that she completed psych appointment and Rosa Holliday was reduced by  following review of symptoms and concerns    -Discussed CT mood  CT believes she is presently slightly manic (working hard and fast with no need of break, sleep difficulties, increased impulsive talking)  - CT reflected on her own patterns signs/symptoms and behavioral anicdotes to mood changes  (manic-needing less sleep, fidgety, going out of my way to engage with people I don't know, impulsive spending, speaking without thinking, rejection sensitivity; balance with regular sleep, limit online spending using wish lists/cart) (depression-poor hygiene, isolation; balance with regular shower, spa day)  -Reviewed ways CT can track mood and support balance (journaling, use of EDUonGo) CT will download and use leighton   -Reviewed and updated CT tx plan  -CT talked about she and  making wiser choices about expenses and realistic plan for future  Currently following plan to move out of debt  CT reflected specifically about looking to purchase a car and then deciding to wait   -Discussed focusing on Self-love Workbook-CT desire to find clarity about her purpose and life direction  During this session, this clinician used the following therapeutic modalities: Supportive Psychotherapy    Substance Abuse was not addressed during this session  If the client is diagnosed with a co-occurring substance use disorder, please indicate any changes in the frequency or amount of use:   Stage of change for addressing substance use diagnoses: No substance use/Not applicable    ASSESSMENT:  Flora Sharpe presents with a Euthymic/ normal mood       her affect is Normal range and intensity and Bright, which is congruent, with her mood and the content of the session  The client has made progress on their goals  Jake Lares presents with a none risk of suicide, none risk of self-harm, and none risk of harm to others  For any risk assessment that surpasses a "low" rating, a safety plan must be developed  A safety plan was indicated: no  If yes, describe in detail    PLAN: Between sessions, Jake Lares will [employer] leighton and try, get copy of self-love workbook  At the next session, the therapist will use Supportive Psychotherapy to review use of new leighton and workbook and monitor mood       Behavioral Health Treatment Plan and Discharge Planning: Jake Lares is aware of and agrees to continue to work on their treatment plan  They have identified and are working toward their discharge goals  yes    Visit start and stop times:    03/20/23  Start Time: 1388  Stop Time: 3143  Total Visit Time: 47 minutes    Virtual Regular Visit    Verification of patient location:    Patient is located in the following state in which I hold an active license [place]      Assessment/Plan:    Problem List Items Addressed This Visit    None  Visit Diagnoses     Bipolar disorder, in partial remission, most recent episode manic ([facility] [address] )    -  Primary          Goals addressed in session: Goal 1          Reason for visit is   Chief Complaint   Patient presents with   • Virtual Regular Visit        Encounter provider Analilia Raza, [facility]    Provider located at [address] [place] [address]  [phone]      Recent Visits  No visits were found meeting these conditions    Showing recent visits within past 7 days and meeting all other requirements  Today's Visits  Date Type Provider Dept   03/20/23 Telemedicine Analilia Raza, [address] [place] [address] Therapist op   Showing today's visits and meeting all other requirements  Future Appointments  No visits were found meeting these conditions  Showing future appointments within next 150 days and meeting all other requirements       The patient was identified by name and date of birth  Paul Corcoran was informed that this is a telemedicine visit and that the visit is being conducted throughthe [employer] platform  She agrees to proceed     My office door was closed  No one else was in the room  She acknowledged consent and understanding of privacy and security of the video platform  The patient has agreed to participate and understands they can discontinue the visit at any time  Patient is aware this is a billable service  Subjective  Paul Corcoran is a 24 y o  female    HPI     Past Medical History:   Diagnosis Date   • BMI 28 0-28 9,adult 1/17/2020   • Depression    • Dysuria     LAST ASSESSED [id number]   • Migraine     LAST ASSESSED [id number]   • Miscarriage 06/2019   • Ovarian cyst        Past Surgical History:   Procedure Laterality Date   • DILATION AND CURETTAGE OF UTERUS  06/2019       Current Outpatient Medications   Medication Sig Dispense Refill   • buPROPion (WELLBUTRIN) 100 mg tablet TAKE ONE TABLET BY MOUTH DAILY IN THE MORNING 90 tablet 0   • escitalopram (Lexapro) 10 mg tablet Take 1 tablet (10 mg total) by mouth daily 30 tablet 1   • lamoTRIgine (LaMICtal) 200 MG tablet Take 1 tablet (200 mg total) by mouth daily 90 tablet 0   • nitrofurantoin (MACROBID) 100 mg capsule Take 1 capsule (100 mg total) by mouth 2 (two) times a day (Patient not taking: Reported on 1/2/2022 ) 10 capsule 0   • norgestimate-ethinyl estradiol (ORTHO TRI-CYCLEN,TRINESSA) 0 18/0 215/0 25 MG-35 MCG per tablet      • ondansetron (Zofran ODT) 4 mg disintegrating tablet Take 1 tablet (4 mg total) by mouth every 6 (six) hours as needed for nausea or vomiting (Patient not taking: Reported on 1/2/2022 ) 20 tablet 0     No current facility-administered medications for this visit          No Known Allergies    Review of Systems    Video Exam    There were no vitals filed for this visit      Physical Exam

## 2023-03-20 NOTE — BH TREATMENT PLAN
Outpatient Behavioral Health Psychotherapy Treatment Plan    Domenica Becker  2000     Date of Initial Psychotherapy Assessment: 07/02/21   Date of Current Treatment Plan: 03/20/23  Treatment Plan Target Date: 07/20/23  Treatment Plan Expiration Date: 09/20/23    Diagnosis:   1  Bipolar disorder, in partial remission, most recent episode manic ([facility])            Area(s) of Need: Mood management and grow self-confidence    Long Term Goal 1 (in the client's own words): I want to be aware of my mood and manage mood changes to avoid unwanted consequences  Stage of Change: Action    Target Date for completion: TBD     Anticipated therapeutic modalities: Supportive therapy     People identified to complete this goal: client, therapist      Objective 1: (identify the means of measuring success in meeting the objective): Regularly use check in tool to monitor signs/symptomtoms       Objective 2: (identify the means of measuring success in meeting the objective): use coping tools to promote return to baseline when mood shifts detected  Long Term Goal 2 (in the client's own words): Take better care of myself and make my needs/desires priority    Stage of Change: Preparation    Target Date for completion: TBD     Anticipated therapeutic modalities: Supportive Therapy     People identified to complete this goal: client and therapist      Objective 1: (identify the means of measuring success in meeting the objective): Obtain copy of and regularly use Self-love workbook to Identify, clarify personal needs and life purpose  Objective 2: (identify the means of measuring success in meeting the objective):   Identify 2 ways to advocate for my needs     Long Term Goal 3 (in the client's own words): I want to care for my mental health    Stage of Change: Action    Target Date for completion: TBD  apply    Anticipated therapeutic modalities: Med management and psychotherapy     People identified to complete this goal: client, psychiatry, therapist      Objective 1: (identify the means of measuring success in meeting the objective): Attend regular psychiatry and psychotherapy appointments      Objective 2: (identify the means of measuring success in meeting the objective): Take meds as prescribed  I am currently under the care of a [facility] psychiatric provider: yes    My [facility] psychiatric provider is: Dr Jeny Alcala    I am currently taking psychiatric medications: Yes, as prescribed    I feel that I will be ready for discharge from mental health care when I reach the following (measurable goal/objective): Can manage mood independently and advocate for my identified needs regularly  For children and adults who have a legal guardian:   Has there been any change to custody orders and/or guardianship status? NA  If yes, attach updated documentation  I have updated my Crisis Plan and have been offered a copy of this plan    [address]: Diagnosis and Treatment Plan explained to Grace Gutierrez [address] acknowledges an understanding of their diagnosis  Mustapha Armas agrees to this treatment plan  I have been offered a copy of this Treatment Plan  Yes    Mustapha Armas, 2000, actively participated in the review and update of this treatment plan during a virtual session, using the [employer] platform  Mustapha Armas  provided verbal consent on 3/20/2023 at 3:50 PM  The treatment plan was transcribed into the [employer] Record at a later time

## 2023-05-08 ENCOUNTER — TELEMEDICINE (OUTPATIENT)
Dept: BEHAVIORAL/MENTAL HEALTH CLINIC | Facility: CLINIC | Age: 23
End: 2023-05-08

## 2023-05-08 DIAGNOSIS — F31.73 BIPOLAR DISORDER, IN PARTIAL REMISSION, MOST RECENT EPISODE MANIC (HCC): Primary | ICD-10-CM

## 2023-05-08 NOTE — PSYCH
"Behavioral Health Psychotherapy Progress Note    Psychotherapy Provided: Individual Psychotherapy     1  Bipolar disorder, in partial remission, most recent episode manic ([address] )            Goals addressed in session: Goal 1     DATA:     -TH reviewed previous session and \"planning items\"  (CT did not use min"[employer] leighton, did address med concerns-now stable)  -CT reported that she has been using the [employer] leighton regularly  CT likes the daily quotes, ideas and check-in  (baby collado grown to young collado)  -TH inquired about CT interest in EMDR therapy  CT stated that she is hoping her memories quiet and stay quiet  In the past, attention to them has \"made it worse\"  CT described her trauma memories/experience as a virus that is part of her that can be activated by triggers  TH affirmed her choice at this time  -TH offered container meditation which CT participated in completing; for use if/when triggered  -CT reflected on a friend situation that used to bring up a great deal of abandonment feelings  CT is using facts and logic to counter getting carried away abandonment thoughts and concerns  CT pleased that she is managing the trigger well  -CT reported that she has been spending time with  regularly and that they are making noticeable progresss  On paying off their debts  CT reported that her father has notices and stated that he is impressed with there progress and discipline  During this session, this clinician used the following therapeutic modalities: Supportive Psychotherapy    Substance Abuse was not addressed during this session  If the client is diagnosed with a co-occurring substance use disorder, please indicate any changes in the frequency or amount of use:   Stage of change for addressing substance use diagnoses: No substance use/Not applicable    ASSESSMENT:  Jose Grijalva presents with a Euthymic/ normal mood       her affect is Normal range and intensity, which is congruent, with her mood and " "the content of the session  The client has made progress on their goals  Karie Jordan presents with a none risk of suicide, none risk of self-harm, and none risk of harm to others  For any risk assessment that surpasses a \"low\" rating, a safety plan must be developed  A safety plan was indicated: no  If yes, describe in detail     PLAN: Between sessions, Karie Jordan attend to self-care (reading, connection with others, rest) as she has been, utilize container if she notices disturbing memories or reactions secondary to her trauma    At the next session, the therapist will use Supportive Psychotherapy to monitor mood and provide interventions needed and wanted by CT       Behavioral Health Treatment Plan and Discharge Planning: Karie Jordan is aware of and agrees to continue to work on their treatment plan  They have identified and are working toward their discharge goals  yes    Visit start and stop times:    05/08/23  Start Time: 1317  Stop Time: 7882  Total Visit Time: 55 minutes    Virtual Regular Visit    Verification of patient location:    Patient is located at Home in the following state in which I hold an active license [place]      Assessment/Plan:    Problem List Items Addressed This Visit    None  Visit Diagnoses     Bipolar disorder, in partial remission, most recent episode manic ([facility] [address] )    -  Primary          Goals addressed in session: Goal 1          Reason for visit is   Chief Complaint   Patient presents with   • Virtual Regular Visit        Encounter provider Rick Wilcox, [facility]    Provider located at [address] [place] [address]  [phone]      Recent Visits  No visits were found meeting these conditions    Showing recent visits within past 7 days and meeting all other requirements  Today's Visits  Date Type Provider Dept   05/08/23 Telemedicine Rick Wilcox, [address] [place] [address] " Therapist [facility]   Showing today's visits and meeting all other requirements  Future Appointments  No visits were found meeting these conditions  Showing future appointments within next 150 days and meeting all other requirements       The patient was identified by name and date of birth  Jadiel Steward was informed that this is a telemedicine visit and that the visit is being conducted throughthe [employer] platform  She agrees to proceed     My office door was closed  No one else was in the room  She acknowledged consent and understanding of privacy and security of the video platform  The patient has agreed to participate and understands they can discontinue the visit at any time  Patient is aware this is a billable service  Subjective  Jadiel Steward is a 24 y o  female          HPI     Past Medical History:   Diagnosis Date   • BMI 28 0-28 9,adult 1/17/2020   • Depression    • Dysuria     LAST ASSESSED [id number]   • Migraine     LAST ASSESSED [id number]   • Miscarriage 06/2019   • Ovarian cyst        Past Surgical History:   Procedure Laterality Date   • DILATION AND CURETTAGE OF UTERUS  06/2019       Current Outpatient Medications   Medication Sig Dispense Refill   • buPROPion (WELLBUTRIN) 100 mg tablet TAKE ONE TABLET BY MOUTH DAILY IN THE MORNING 90 tablet 0   • escitalopram (LEXAPRO) 20 mg tablet Take 1 tablet (20 mg total) by mouth daily 90 tablet 0   • lamoTRIgine (LaMICtal) 200 MG tablet Take 1 tablet (200 mg total) by mouth daily 90 tablet 0   • nitrofurantoin (MACROBID) 100 mg capsule Take 1 capsule (100 mg total) by mouth 2 (two) times a day (Patient not taking: Reported on 1/2/2022 ) 10 capsule 0   • norgestimate-ethinyl estradiol (ORTHO TRI-CYCLEN,TRINESSA) 0 18/0 215/0 25 MG-35 MCG per tablet      • ondansetron (Zofran ODT) 4 mg disintegrating tablet Take 1 tablet (4 mg total) by mouth every 6 (six) hours as needed for nausea or vomiting (Patient not taking: Reported on 1/2/2022 ) 20 tablet 0 No current facility-administered medications for this visit  No Known Allergies    Review of Systems    Video Exam    There were no vitals filed for this visit      Physical Exam

## 2023-06-05 ENCOUNTER — TELEMEDICINE (OUTPATIENT)
Dept: BEHAVIORAL/MENTAL HEALTH CLINIC | Facility: CLINIC | Age: 23
End: 2023-06-05
Payer: COMMERCIAL

## 2023-06-05 DIAGNOSIS — F31.73 BIPOLAR DISORDER, IN PARTIAL REMISSION, MOST RECENT EPISODE MANIC (HCC): Primary | ICD-10-CM

## 2023-06-05 DIAGNOSIS — F43.23 ADJUSTMENT DISORDER WITH MIXED ANXIETY AND DEPRESSED MOOD: ICD-10-CM

## 2023-06-05 PROCEDURE — 90837 PSYTX W PT 60 MINUTES: CPT | Performed by: COUNSELOR

## 2023-06-05 NOTE — PSYCH
"Behavioral Health Psychotherapy Progress Note    Psychotherapy Provided: Individual Psychotherapy     1  Bipolar disorder, in partial remission, most recent episode manic ([address] )        2  Adjustment disorder with mixed anxiety and depressed mood            Goals addressed in session: Goal 1     DATA:     -CT reported that she has been doing well overall  CT noted that she recognized triggering surrounding speaking up for herself and then being talked over  CT reflected on the culture of her family that has/had strong views politically and religiously  CT's point of view when different than family's (tends to be conservative)  Considered the judgement that comes with no tolerance in her family  Allan Reynoso talk over me, they criticize my point of view  -CT stated that she feels pressure to agree with the family, marcie her father  \"he does not tolerate if my view is different and he presses hard to trying to make me guilty fir different views\"  -CT reflected on the sense of unfairness about what happened to her as a child  TH invited CT to expand on what this was like for her in her situation  TH invited CT to describe what the lingering effect is on CT  CT reflected on her anger  CT reflected on other damages that occurred in her development (lacked self-love) as well as anxiety and PA from a young age  -CT stated that she has heard \"all things happen for a reason and sometimes you need to find a reason\"  TH inquired about the reasons she has found  \"I appreciate and am grateful for my in-laws  \" Also identified her compassionate, nonjudgmental support of others and being a safe space to share other's trauma as additional reasons  During this session, this clinician used the following therapeutic modalities: Supportive Psychotherapy     Substance Abuse was not addressed during this session   If the client is diagnosed with a co-occurring substance use disorder, please indicate any changes in the frequency or amount of " "use:   Stage of change for addressing substance use diagnoses: No substance use/Not applicable    ASSESSMENT:  Nichole Lott presents with a Euthymic/ normal mood  her affect is Normal range and intensity, which is congruent, with her mood and the content of the session  The client has made progress on their goals  Nichole Lott presents with a none risk of suicide, none risk of self-harm, and none risk of harm to others  For any risk assessment that surpasses a \"low\" rating, a safety plan must be developed  A safety plan was indicated: no  If yes, describe in detail     PLAN: Between sessions, Nichole Lott will express her views and opinions in nonjudgmental environments, validate other's views and feelings  At the next session, the therapist will use Supportive Psychotherapy to monitor mood and identify/clarify growth and healing       Behavioral Health Treatment Plan and Discharge Planning: Nichole Lott is aware of and agrees to continue to work on their treatment plan  They have identified and are working toward their discharge goals   yes    Visit start and stop times:    06/05/23  Start Time: 1506  Stop Time: 1601  Total Visit Time: 55 minutes    Virtual Regular Visit    Verification of patient location:    Patient is located at Home in the following state in which I hold an active license [place]      Assessment/Plan:    Problem List Items Addressed This Visit        Other    Adjustment disorder with mixed anxiety and depressed mood   Other Visit Diagnoses     Bipolar disorder, in partial remission, most recent episode manic ([facility] )    -  Primary          Goals addressed in session: Goal 1          Reason for visit is   Chief Complaint   Patient presents with   • Virtual Regular Visit        Encounter provider Adrian Goss, [facility]    Provider located at [address] [place] [address]  [facility] " [address]  [phone]      Recent Visits  No visits were found meeting these conditions  Showing recent visits within past 7 days and meeting all other requirements  Today's Visits  Date Type Provider Dept   06/05/23 Telemedicine Leslie Michelle, [address] [facility] Therapist [facility]   Showing today's visits and meeting all other requirements  Future Appointments  No visits were found meeting these conditions  Showing future appointments within next 150 days and meeting all other requirements       The patient was identified by name and date of birth  Betzaida Lobo was informed that this is a telemedicine visit and that the visit is being conducted throughthe [employer] platform  She agrees to proceed     My office door was closed  No one else was in the room  She acknowledged consent and understanding of privacy and security of the video platform  The patient has agreed to participate and understands they can discontinue the visit at any time  Patient is aware this is a billable service  Subjective  Betzaida Lobo is a 24 y o  female          HPI     Past Medical History:   Diagnosis Date   • BMI 28 0-28 9,adult 1/17/2020   • Depression    • Dysuria     LAST ASSESSED [id number]   • Migraine     LAST ASSESSED [id number]   • Miscarriage 06/2019   • Ovarian cyst        Past Surgical History:   Procedure Laterality Date   • DILATION AND CURETTAGE OF UTERUS  06/2019       Current Outpatient Medications   Medication Sig Dispense Refill   • buPROPion (WELLBUTRIN) 100 mg tablet TAKE ONE TABLET BY MOUTH DAILY IN THE MORNING 90 tablet 0   • escitalopram (LEXAPRO) 20 mg tablet Take 1 tablet (20 mg total) by mouth daily 90 tablet 0   • lamoTRIgine (LaMICtal) 200 MG tablet Take 1 tablet (200 mg total) by mouth daily 90 tablet 0   • nitrofurantoin (MACROBID) 100 mg capsule Take 1 capsule (100 mg total) by mouth 2 (two) times a day (Patient not taking: Reported on 1/2/2022 ) 10 capsule 0   • norgestimate-ethinyl estradiol (ORTHO TRI-CYCLEN,TRINESSA) 0 18/0 215/0 25 MG-35 MCG per tablet      • ondansetron (Zofran ODT) 4 mg disintegrating tablet Take 1 tablet (4 mg total) by mouth every 6 (six) hours as needed for nausea or vomiting (Patient not taking: Reported on 1/2/2022 ) 20 tablet 0     No current facility-administered medications for this visit  No Known Allergies    Review of Systems    Video Exam    There were no vitals filed for this visit      Physical Exam

## 2023-06-14 ENCOUNTER — TELEMEDICINE (OUTPATIENT)
Dept: PSYCHIATRY | Facility: CLINIC | Age: 23
End: 2023-06-14
Payer: COMMERCIAL

## 2023-06-14 DIAGNOSIS — F31.9 BIPOLAR AFFECTIVE DISORDER, REMISSION STATUS UNSPECIFIED (HCC): Primary | ICD-10-CM

## 2023-06-14 DIAGNOSIS — F41.1 GENERALIZED ANXIETY DISORDER: ICD-10-CM

## 2023-06-14 PROCEDURE — 99214 OFFICE O/P EST MOD 30 MIN: CPT | Performed by: PSYCHIATRY & NEUROLOGY

## 2023-06-14 RX ORDER — BUSPIRONE HYDROCHLORIDE 7.5 MG/1
7.5 TABLET ORAL DAILY
Qty: 30 TABLET | Refills: 1 | Status: SHIPPED | OUTPATIENT
Start: 2023-06-14 | End: 2023-06-22

## 2023-06-14 NOTE — PSYCH
Virtual Regular Visit    Verification of patient location:    Patient is located at Home in the following state in which I hold an active license [place]      Assessment/Plan:    Problem List Items Addressed This Visit    None      Goals addressed in session: Goal 1          Reason for visit is   Chief Complaint   Patient presents with   • Medication Management        Encounter provider Michele Rivera MD    Provider located at [address] [facility]  [address] [facility]  [address]  [phone]      Recent Visits  No visits were found meeting these conditions  Showing recent visits within past 7 days and meeting all other requirements  Today's Visits  Date Type Provider Dept   06/14/23 Telemedicine Michele Rivera, [address] [facility] today's visits and meeting all other requirements  Future Appointments  No visits were found meeting these conditions  Showing future appointments within next 150 days and meeting all other requirements       The patient was identified by name and date of birth  Td Walton was informed that this is a telemedicine visit and that the visit is being conducted throughthe [facility]  She agrees to proceed     My office door was closed  No one else was in the room  She acknowledged consent and understanding of privacy and security of the video platform  The patient has agreed to participate and understands they can discontinue the visit at any time  Patient is aware this is a billable service  Subjective  Td Walton is a 24 y o  female with bipolar do and anxiety presents for an earlier appt for worsening of anxiety  compliant with meds, denies SE  Denies stressors/changes     HPI   Anxiety - increased; 2-3 x week, for 2-3 hrs; in the morning before getting up - tight chest, SOB, GI spx   Pt uses coping skills ( breathing) - not very effective  Mood - improved - more stable; denies mood swings or depresison    Past Medical History:   Diagnosis Date   • BMI 28 0-28 9,adult 1/17/2020   • Depression    • Dysuria     LAST ASSESSED [id number]   • Migraine     LAST ASSESSED [id number]   • Miscarriage 06/2019   • Ovarian cyst        Past Surgical History:   Procedure Laterality Date   • DILATION AND CURETTAGE OF UTERUS  06/2019       Current Outpatient Medications   Medication Sig Dispense Refill   • buPROPion (WELLBUTRIN) 100 mg tablet TAKE ONE TABLET BY MOUTH DAILY IN THE MORNING 90 tablet 0   • escitalopram (LEXAPRO) 20 mg tablet Take 1 tablet (20 mg total) by mouth daily 90 tablet 0   • lamoTRIgine (LaMICtal) 200 MG tablet Take 1 tablet (200 mg total) by mouth daily 90 tablet 0   • nitrofurantoin (MACROBID) 100 mg capsule Take 1 capsule (100 mg total) by mouth 2 (two) times a day (Patient not taking: Reported on 1/2/2022 ) 10 capsule 0   • norgestimate-ethinyl estradiol (ORTHO TRI-CYCLEN,TRINESSA) 0 18/0 215/0 25 MG-35 MCG per tablet      • ondansetron (Zofran ODT) 4 mg disintegrating tablet Take 1 tablet (4 mg total) by mouth every 6 (six) hours as needed for nausea or vomiting (Patient not taking: Reported on 1/2/2022 ) 20 tablet 0     No current facility-administered medications for this visit  No Known Allergies    Review of Systems   Constitutional: Negative for activity change and appetite change  Psychiatric/Behavioral: Negative for dysphoric mood, sleep disturbance and suicidal ideas  The patient is nervous/anxious  Video Exam    There were no vitals filed for this visit  Physical Exam  Constitutional:       Appearance: Normal appearance  She is normal weight  Neurological:      Mental Status: She is alert  Psychiatric:         Attention and Perception: Attention and perception normal          Mood and Affect: Affect normal  Mood is anxious  Speech: Speech normal          Behavior: Behavior normal  Behavior is cooperative  Thought Content:  Thought content normal  Judgment: Judgment normal           Visit Time    Visit Start Time: 4 41 pm   Visit Stop Time: 4 50 pm   Total Visit Duration: 15 minutes

## 2023-06-22 ENCOUNTER — TELEMEDICINE (OUTPATIENT)
Dept: PSYCHIATRY | Facility: CLINIC | Age: 23
End: 2023-06-22
Payer: COMMERCIAL

## 2023-06-22 DIAGNOSIS — F41.1 GENERALIZED ANXIETY DISORDER: ICD-10-CM

## 2023-06-22 DIAGNOSIS — F31.9 BIPOLAR AFFECTIVE DISORDER, REMISSION STATUS UNSPECIFIED (HCC): Primary | ICD-10-CM

## 2023-06-22 PROCEDURE — 99214 OFFICE O/P EST MOD 30 MIN: CPT | Performed by: PSYCHIATRY & NEUROLOGY

## 2023-06-22 RX ORDER — HYDROXYZINE HYDROCHLORIDE 10 MG/1
10 TABLET, FILM COATED ORAL DAILY PRN
Qty: 30 TABLET | Refills: 1 | Status: SHIPPED | OUTPATIENT
Start: 2023-06-22

## 2023-06-22 NOTE — PSYCH
"  Virtual Regular Visit    Verification of patient location:    Patient is located at Home in the following state in which I hold an active license [place]      Assessment/Plan:    Problem List Items Addressed This Visit    None      Goals addressed in session: Goal 1          Reason for visit is   Chief Complaint   Patient presents with   • Medication Management        Encounter provider Liza Goyal MD    Provider located at [address] [facility] [address]  [address]  [phone]      Recent Visits  No visits were found meeting these conditions  Showing recent visits within past 7 days and meeting all other requirements  Today's Visits  Date Type Provider Dept   06/22/23 Telemedicine Liza Goyal, [address] [facility] today's visits and meeting all other requirements  Future Appointments  No visits were found meeting these conditions  Showing future appointments within next 150 days and meeting all other requirements       The patient was identified by name and date of birth  Aleta Espinoza was informed that this is a telemedicine visit and that the visit is being conducted throughthe [facility]  She agrees to proceed     My office door was closed  No one else was in the room  She acknowledged consent and understanding of privacy and security of the video platform  The patient has agreed to participate and understands they can discontinue the visit at any time  Patient is aware this is a billable service  Subjective  Aleta Espinoza is a 24 y o  female with bipoalr do and anxiety presents for an urgent appt for meds SE  She stopped buspar 2 days ago b/o SE sedation and inability to function - improved; close to baseline     Continues to work    HPI   Mood - reports as \" same\"; denies mood swings; mild depression; better energy off buspar  Anxiety - continues to have episodes of anxiety in the morning ( less often) - " tight chest GI spx  Past Medical History:   Diagnosis Date   • BMI 28 0-28 9,adult 1/17/2020   • Depression    • Dysuria     LAST ASSESSED [id number]   • Migraine     LAST ASSESSED [id number]   • Miscarriage 06/2019   • Ovarian cyst        Past Surgical History:   Procedure Laterality Date   • DILATION AND CURETTAGE OF UTERUS  06/2019       Current Outpatient Medications   Medication Sig Dispense Refill   • buPROPion (WELLBUTRIN) 100 mg tablet TAKE ONE TABLET BY MOUTH DAILY IN THE MORNING 90 tablet 0   • busPIRone (BUSPAR) 7 5 mg tablet Take 1 tablet (7 5 mg total) by mouth daily 30 tablet 1   • escitalopram (LEXAPRO) 20 mg tablet Take 1 tablet (20 mg total) by mouth daily 90 tablet 0   • lamoTRIgine (LaMICtal) 200 MG tablet Take 1 tablet (200 mg total) by mouth daily 90 tablet 0   • nitrofurantoin (MACROBID) 100 mg capsule Take 1 capsule (100 mg total) by mouth 2 (two) times a day (Patient not taking: Reported on 1/2/2022 ) 10 capsule 0   • norgestimate-ethinyl estradiol (ORTHO TRI-CYCLEN,TRINESSA) 0 18/0 215/0 25 MG-35 MCG per tablet      • ondansetron (Zofran ODT) 4 mg disintegrating tablet Take 1 tablet (4 mg total) by mouth every 6 (six) hours as needed for nausea or vomiting (Patient not taking: Reported on 1/2/2022 ) 20 tablet 0     No current facility-administered medications for this visit  No Known Allergies    Review of Systems   Constitutional: Negative for activity change and appetite change  Psychiatric/Behavioral: Negative for dysphoric mood, sleep disturbance and suicidal ideas  The patient is nervous/anxious  Video Exam    There were no vitals filed for this visit  Physical Exam  Constitutional:       Appearance: Normal appearance  She is normal weight  Neurological:      Mental Status: She is alert     Psychiatric:         Attention and Perception: Attention and perception normal          Mood and Affect: Mood and affect normal          Speech: Speech normal          Behavior: Behavior normal  Behavior is cooperative  Thought Content:  Thought content normal          Judgment: Judgment normal           Visit Time    Visit Start Time: 7 40 pm   Visit Stop Time: 7 47 pm   Total Visit Duration: 15 minutes

## 2023-07-05 DIAGNOSIS — F41.1 GENERALIZED ANXIETY DISORDER: ICD-10-CM

## 2023-07-05 RX ORDER — ESCITALOPRAM OXALATE 20 MG/1
20 TABLET ORAL DAILY
Qty: 90 TABLET | Refills: 0 | Status: CANCELLED | OUTPATIENT
Start: 2023-07-05

## 2023-07-05 NOTE — TELEPHONE ENCOUNTER
Pharmacist aware that this will be taken care of tomorrow    Medication removed so this encounter can be closed

## 2023-07-06 ENCOUNTER — TELEMEDICINE (OUTPATIENT)
Dept: PSYCHIATRY | Facility: CLINIC | Age: 23
End: 2023-07-06
Payer: COMMERCIAL

## 2023-07-06 DIAGNOSIS — F41.1 GENERALIZED ANXIETY DISORDER: Primary | ICD-10-CM

## 2023-07-06 DIAGNOSIS — F31.9 BIPOLAR AFFECTIVE DISORDER, REMISSION STATUS UNSPECIFIED (HCC): ICD-10-CM

## 2023-07-06 DIAGNOSIS — F41.8 DEPRESSION WITH ANXIETY: ICD-10-CM

## 2023-07-06 PROCEDURE — 99214 OFFICE O/P EST MOD 30 MIN: CPT | Performed by: PSYCHIATRY & NEUROLOGY

## 2023-07-06 RX ORDER — ESCITALOPRAM OXALATE 20 MG/1
20 TABLET ORAL DAILY
Qty: 90 TABLET | Refills: 0 | Status: SHIPPED | OUTPATIENT
Start: 2023-07-06

## 2023-07-06 RX ORDER — BUPROPION HYDROCHLORIDE 100 MG/1
TABLET ORAL
Qty: 90 TABLET | Refills: 0 | Status: SHIPPED | OUTPATIENT
Start: 2023-07-06

## 2023-07-06 RX ORDER — HYDROXYZINE HYDROCHLORIDE 10 MG/1
10 TABLET, FILM COATED ORAL DAILY PRN
Qty: 30 TABLET | Refills: 1 | Status: SHIPPED | OUTPATIENT
Start: 2023-07-06

## 2023-07-06 RX ORDER — LAMOTRIGINE 200 MG/1
200 TABLET ORAL DAILY
Qty: 90 TABLET | Refills: 0 | Status: SHIPPED | OUTPATIENT
Start: 2023-07-06

## 2023-07-06 NOTE — PSYCH
Virtual Regular Visit    Verification of patient location:    Patient is located at Home in the following state in which I hold an active license [place]      Assessment/Plan:    Problem List Items Addressed This Visit    None      Goals addressed in session: Goal 1          Reason for visit is   Chief Complaint   Patient presents with   • Medication Management        Encounter provider Benji Santacruz MD    Provider located at [address] [place] [address],[address]  [address]  [phone]      Recent Visits  No visits were found meeting these conditions. Showing recent visits within past 7 days and meeting all other requirements  Today's Visits  Date Type Provider Dept   07/06/23 Telemedicine Benji Santacruz, [address] today's visits and meeting all other requirements  Future Appointments  No visits were found meeting these conditions. Showing future appointments within next 150 days and meeting all other requirements       The patient was identified by name and date of birth. Jojo Darling was informed that this is a telemedicine visit and that the visit is being [place]. She agrees to proceed. .  My office door was closed. No one else was in the room. She acknowledged consent and understanding of privacy and security of the video platform. The patient has agreed to participate and understands they can discontinue the visit at any time. Patient is aware this is a billable service. Subjective  Jojo Darling is a 24 y.o. female with bipolar do and anxiety presents for regular f/u  Used atarax 1/2 tab prn x 2 - effective; no SE  Continued other meds  Denies acute medical problems or other stressors .       HPI   Mood - reports as mostly stable; denies mood swings or depression; good energy and motivation; active and social  Anxiety - one episode of waking up " in panic" and another episode of feeling anxious in the morning - atarax prn helped    Past Medical History:   Diagnosis Date   • BMI 28.0-28.9,adult 1/17/2020   • Depression    • Dysuria     LAST ASSESSED [id number]   • Migraine     LAST ASSESSED [id number]   • Miscarriage 06/2019   • Ovarian cyst        Past Surgical History:   Procedure Laterality Date   • DILATION AND CURETTAGE OF UTERUS  06/2019       Current Outpatient Medications   Medication Sig Dispense Refill   • buPROPion (WELLBUTRIN) 100 mg tablet TAKE ONE TABLET BY MOUTH DAILY IN THE MORNING 90 tablet 0   • escitalopram (LEXAPRO) 20 mg tablet Take 1 tablet (20 mg total) by mouth daily 90 tablet 0   • hydrOXYzine HCL (ATARAX) 10 mg tablet Take 1 tablet (10 mg total) by mouth daily as needed for anxiety 30 tablet 1   • lamoTRIgine (LaMICtal) 200 MG tablet Take 1 tablet (200 mg total) by mouth daily 90 tablet 0   • nitrofurantoin (MACROBID) 100 mg capsule Take 1 capsule (100 mg total) by mouth 2 (two) times a day (Patient not taking: Reported on 1/2/2022 ) 10 capsule 0   • norgestimate-ethinyl estradiol (ORTHO TRI-CYCLEN,TRINESSA) 0.18/0.215/0.25 MG-35 MCG per tablet      • ondansetron (Zofran ODT) 4 mg disintegrating tablet Take 1 tablet (4 mg total) by mouth every 6 (six) hours as needed for nausea or vomiting (Patient not taking: Reported on 1/2/2022 ) 20 tablet 0     No current facility-administered medications for this visit. No Known Allergies    Review of Systems   Constitutional: Negative for activity change and appetite change. Psychiatric/Behavioral: Negative for dysphoric mood, sleep disturbance and suicidal ideas. The patient is not nervous/anxious. Video Exam    There were no vitals filed for this visit. Physical Exam  Constitutional:       Appearance: Normal appearance. She is normal weight. Neurological:      Mental Status: She is alert.    Psychiatric:         Attention and Perception: Attention and perception normal.         Mood and Affect: Mood normal. Affect is blunt. Speech: Speech normal.         Behavior: Behavior normal. Behavior is cooperative. Thought Content:  Thought content normal.         Judgment: Judgment normal.          Visit Time    Visit Start Time: 5.55 pm   Visit Stop Time: 6.03 pm   Total Visit Duration: 15 minutes

## 2023-07-10 ENCOUNTER — TELEMEDICINE (OUTPATIENT)
Dept: BEHAVIORAL/MENTAL HEALTH CLINIC | Facility: CLINIC | Age: 23
End: 2023-07-10
Payer: COMMERCIAL

## 2023-07-10 DIAGNOSIS — F43.23 ADJUSTMENT DISORDER WITH MIXED ANXIETY AND DEPRESSED MOOD: ICD-10-CM

## 2023-07-10 DIAGNOSIS — F31.73 BIPOLAR DISORDER, IN PARTIAL REMISSION, MOST RECENT EPISODE MANIC (HCC): Primary | ICD-10-CM

## 2023-07-10 PROCEDURE — 90834 PSYTX W PT 45 MINUTES: CPT | Performed by: COUNSELOR

## 2023-07-10 NOTE — PSYCH
Behavioral Health Psychotherapy Progress Note    Psychotherapy Provided: Individual Psychotherapy     1. Bipolar disorder, in partial remission, most recent episode manic ([address])        2. Adjustment disorder with mixed anxiety and depressed mood            Goals addressed in session: Goal 1     DATA:     -CT reported no "big events or situations" since last session. -CT reported that she and  are on track with their financial planning. Looking forward to saving beginning in Sept and then will plan to purchase car and establish common household. -TH invited CT reflected on current . CT reflected on various compliments and statements others make that make her uncomfortable and hard to accept. (compliments about appearance, being vulnerable with others). CT stated that she is aware of challenges and triggers that she has because of her childhood trauma experiences. CT confirmed her desire to "not do any specific work on trauma now". -CT reflected that she is currently happy and satisfied with the "way things are going". -Discussed self-care and TH will share the self-care inventory as an additional check in for awareness. During this session, this clinician used the following therapeutic modalities: Supportive Psychotherapy    Substance Abuse was not addressed during this session. If the client is diagnosed with a co-occurring substance use disorder, please indicate any changes in the frequency or amount of use: . Stage of change for addressing substance use diagnoses: No substance use/Not applicable    ASSESSMENT:  Anne Henry presents with a Euthymic/ normal mood. her affect is Normal range and intensity, which is congruent, with her mood and the content of the session. The client has made progress on their goals. Anne Henry presents with a none risk of suicide, none risk of self-harm, and none risk of harm to others.     For any risk assessment that surpasses a "low" rating, a safety plan must be developed. A safety plan was indicated: no  If yes, describe in detail     PLAN: Between sessions, Verla Payment will review self-care inventory, maintain current practices and add practices as needed. . At the next session, the therapist will use Supportive Psychotherapy to monitor mood and support baseline maintenance. .    Behavioral Health Treatment Plan and Discharge Planning: Verla Payment is aware of and agrees to continue to work on their treatment plan. They have identified and are working toward their discharge goals. yes    Visit start and stop times:    07/10/23  Start Time: 0391  Stop Time: 1243  Total Visit Time: 39 minutes    Virtual Regular Visit    Verification of patient location:    Patient is located at Home in the following state in which I hold an active license [place]      Assessment/Plan:    Problem List Items Addressed This Visit        Other    Adjustment disorder with mixed anxiety and depressed mood   Other Visit Diagnoses     Bipolar disorder, in partial remission, most recent episode manic ([address] [facility])    -  Primary          Goals addressed in session: Goal 1          Reason for visit is   Chief Complaint   Patient presents with   • Virtual Regular Visit        Encounter provider Josiah Becerril [facility]    Provider located at [id number] [place] [address]  [address] [place], [place] [phone]      Recent Visits  No visits were found meeting these conditions. Showing recent visits within past 7 days and meeting all other requirements  Today's Visits  Date Type Provider Dept   07/10/23 Telemedicine Josiah Becerril [address] [place] [address] Therapist [facility]   Showing today's visits and meeting all other requirements  Future Appointments  No visits were found meeting these conditions.   Showing future appointments within next 150 days and meeting all other requirements       The patient was identified by name and date of birth. Terrie Ho was informed that this is a telemedicine visit and that the visit is being conducted throughthe [employer] platform. She agrees to proceed. .  My office door was closed. No one else was in the room. She acknowledged consent and understanding of privacy and security of the video platform. The patient has agreed to participate and understands they can discontinue the visit at any time. Patient is aware this is a billable service. Subjective  Terrie Ho is a 24 y.o. female  . HPI     Past Medical History:   Diagnosis Date   • BMI 28.0-28.9,adult 1/17/2020   • Depression    • Dysuria     LAST ASSESSED [id number]   • Migraine     LAST ASSESSED [id number]   • Miscarriage 06/2019   • Ovarian cyst        Past Surgical History:   Procedure Laterality Date   • DILATION AND CURETTAGE OF UTERUS  06/2019       Current Outpatient Medications   Medication Sig Dispense Refill   • buPROPion (WELLBUTRIN) 100 mg tablet TAKE ONE TABLET BY MOUTH DAILY IN THE MORNING 90 tablet 0   • escitalopram (LEXAPRO) 20 mg tablet Take 1 tablet (20 mg total) by mouth daily 90 tablet 0   • hydrOXYzine HCL (ATARAX) 10 mg tablet Take 1 tablet (10 mg total) by mouth daily as needed for anxiety 30 tablet 1   • lamoTRIgine (LaMICtal) 200 MG tablet Take 1 tablet (200 mg total) by mouth daily 90 tablet 0   • nitrofurantoin (MACROBID) 100 mg capsule Take 1 capsule (100 mg total) by mouth 2 (two) times a day (Patient not taking: Reported on 1/2/2022 ) 10 capsule 0   • norgestimate-ethinyl estradiol (ORTHO TRI-CYCLEN,TRINESSA) 0.18/0.215/0.25 MG-35 MCG per tablet      • ondansetron (Zofran ODT) 4 mg disintegrating tablet Take 1 tablet (4 mg total) by mouth every 6 (six) hours as needed for nausea or vomiting (Patient not taking: Reported on 1/2/2022 ) 20 tablet 0     No current facility-administered medications for this visit.         No Known Allergies    Review of Systems    Video Exam    There were no vitals filed for this visit.     Physical Exam

## 2023-08-07 ENCOUNTER — TELEMEDICINE (OUTPATIENT)
Dept: BEHAVIORAL/MENTAL HEALTH CLINIC | Facility: CLINIC | Age: 23
End: 2023-08-07
Payer: COMMERCIAL

## 2023-08-07 DIAGNOSIS — F43.23 ADJUSTMENT DISORDER WITH MIXED ANXIETY AND DEPRESSED MOOD: ICD-10-CM

## 2023-08-07 DIAGNOSIS — F31.73 BIPOLAR DISORDER, IN PARTIAL REMISSION, MOST RECENT EPISODE MANIC (HCC): Primary | ICD-10-CM

## 2023-08-07 PROCEDURE — 90832 PSYTX W PT 30 MINUTES: CPT | Performed by: COUNSELOR

## 2023-08-07 NOTE — PSYCH
Behavioral Health Psychotherapy Progress Note    Psychotherapy Provided: Individual Psychotherapy     1. Bipolar disorder, in partial remission, most recent episode manic ([address])        2. Adjustment disorder with mixed anxiety and depressed mood            Goals addressed in session: Goal 1     DATA:     -CT reported that she has been doing well, "no new issues, life has been going well". -CT had not looked at the self-care inventory sent after previous session. CT described ways she cares for herself. -TH explained anticipated change in her availability and need to transfer to new therapist or discharge as previously discussed in sessions. TH reviewed progress CT has made towards her goals and sustained stability. (session frequency moved to monthly for extended period of time. CT will continue  With regular psych engagement. -TH and CT agreed to discharge from therapy at this time. During this session, this clinician used the following therapeutic modalities: Supportive Psychotherapy    Substance Abuse was not addressed during this session. If the client is diagnosed with a co-occurring substance use disorder, please indicate any changes in the frequency or amount of use: . Stage of change for addressing substance use diagnoses: No substance use/Not applicable    ASSESSMENT:  Jojo Darling presents with a Euthymic/ normal mood. her affect is Normal range and intensity, which is congruent, with her mood and the content of the session. The client has made progress on their goals. Jojo Darling presents with a none risk of suicide, none risk of self-harm, and none risk of harm to others. For any risk assessment that surpasses a "low" rating, a safety plan must be developed. A safety plan was indicated: no  If yes, describe in detail     PLAN: Between sessions, Jojo Darling will take meds as prescribed, engage with psych regularly, continue self-care routines and priority. .  The therapist will complete discharge action. .    Behavioral Health Treatment Plan and Discharge Planning: Dolores Mancera is aware of and agrees to continue to work on their treatment plan. They have identified and are working toward their discharge goals. yes    Visit start and stop times:    08/07/23  Start Time: 1836  Stop Time: 1230  Total Visit Time: 26 minutes    Virtual Regular Visit    Verification of patient location:    Patient is located at Home in the following state in which I hold an active license [place]      Assessment/Plan:    Problem List Items Addressed This Visit        Other    Adjustment disorder with mixed anxiety and depressed mood   Other Visit Diagnoses     Bipolar disorder, in partial remission, most recent episode manic ([address])    -  Primary          Goals addressed in session: Goal 1          Reason for visit is   Chief Complaint   Patient presents with   • Virtual Regular Visit        Encounter provider Lien Fisher [facility]    Provider located at [address] [place] [address]  [address] [place], [place] [phone]      Recent Visits  No visits were found meeting these conditions. Showing recent visits within past 7 days and meeting all other requirements  Today's Visits  Date Type Provider Dept   08/07/23 Telemedicine Lien Fisher[address] Therapist [facility]   Showing today's visits and meeting all other requirements  Future Appointments  No visits were found meeting these conditions. Showing future appointments within next 150 days and meeting all other requirements       The patient was identified by name and date of birth. Dolores Mancera was informed that this is a telemedicine visit and that the visit is being conducted throughthe [employer] platform. She agrees to proceed. .  My office door was closed. No one else was in the room.   She acknowledged consent and understanding of privacy and security of the video platform. The patient has agreed to participate and understands they can discontinue the visit at any time. Patient is aware this is a billable service. Subjective  Jojo Darling is a 24 y.o. female  . HPI     Past Medical History:   Diagnosis Date   • BMI 28.0-28.9,adult 1/17/2020   • Depression    • Dysuria     LAST ASSESSED [id number]   • Migraine     LAST ASSESSED [id number]   • Miscarriage 06/2019   • Ovarian cyst        Past Surgical History:   Procedure Laterality Date   • DILATION AND CURETTAGE OF UTERUS  06/2019       Current Outpatient Medications   Medication Sig Dispense Refill   • buPROPion (WELLBUTRIN) 100 mg tablet TAKE ONE TABLET BY MOUTH DAILY IN THE MORNING 90 tablet 0   • escitalopram (LEXAPRO) 20 mg tablet Take 1 tablet (20 mg total) by mouth daily 90 tablet 0   • hydrOXYzine HCL (ATARAX) 10 mg tablet Take 1 tablet (10 mg total) by mouth daily as needed for anxiety 30 tablet 1   • lamoTRIgine (LaMICtal) 200 MG tablet Take 1 tablet (200 mg total) by mouth daily 90 tablet 0   • nitrofurantoin (MACROBID) 100 mg capsule Take 1 capsule (100 mg total) by mouth 2 (two) times a day (Patient not taking: Reported on 1/2/2022 ) 10 capsule 0   • norgestimate-ethinyl estradiol (ORTHO TRI-CYCLEN,TRINESSA) 0.18/0.215/0.25 MG-35 MCG per tablet      • ondansetron (Zofran ODT) 4 mg disintegrating tablet Take 1 tablet (4 mg total) by mouth every 6 (six) hours as needed for nausea or vomiting (Patient not taking: Reported on 1/2/2022 ) 20 tablet 0     No current facility-administered medications for this visit. No Known Allergies    Review of Systems    Video Exam    There were no vitals filed for this visit.     Physical Exam     Psychotherapy Discharge Summary    Preferred Name: Jojo Darling  YOB: 2000    Admission date to psychotherapy: 6/23/21    Referred by: self    Presenting Problem: Depression and Anxiety    Course of treatment included : medication management and individual therapy     Progress/Outcome of Treatment Goals (brief summary of course of treatment) Client kept regular appointments, completed out of session exercises, and achieved goals. Treatment Complications (if any): No complications    Treatment Progress: excellent    Current [facility] Provider: Dr. Yannick Finley    Discharge Medications include: Wellbutrin, Lexapro, Atarax, Lamictal    Discharge Date: 8/7/23    Discharge Diagnosis:   1. Bipolar disorder, in partial remission, most recent episode manic ([address])        2. Adjustment disorder with mixed anxiety and depressed mood            Criteria for Discharge: completed treatment goals and objectives and is no longer in need of services    Aftercare recommendations include (include specific referral names and phone numbers, if appropriate): [facility] psychiatry, Client is welcome to return to therapy in the future if needed.     Prognosis: excellent

## 2023-09-18 DIAGNOSIS — F41.1 GENERALIZED ANXIETY DISORDER: ICD-10-CM

## 2023-09-18 RX ORDER — ESCITALOPRAM OXALATE 20 MG/1
TABLET ORAL
Qty: 30 TABLET | Refills: 1 | Status: SHIPPED | OUTPATIENT
Start: 2023-09-18 | End: 2023-09-28 | Stop reason: SDUPTHER

## 2023-09-18 NOTE — TELEPHONE ENCOUNTER
Pt Jordan Hurst , 2000 , [facility] chart was reviewed.  Lexapro 20mg was sent to [address] [place] [address]    This note was not shared with the patient due to reasonable likelihood of causing patient harm

## 2023-09-28 ENCOUNTER — TELEMEDICINE (OUTPATIENT)
Dept: PSYCHIATRY | Facility: CLINIC | Age: 23
End: 2023-09-28
Payer: COMMERCIAL

## 2023-09-28 DIAGNOSIS — F41.1 GENERALIZED ANXIETY DISORDER: ICD-10-CM

## 2023-09-28 DIAGNOSIS — F41.8 DEPRESSION WITH ANXIETY: ICD-10-CM

## 2023-09-28 DIAGNOSIS — F31.9 BIPOLAR AFFECTIVE DISORDER, REMISSION STATUS UNSPECIFIED (HCC): Primary | ICD-10-CM

## 2023-09-28 PROCEDURE — 99214 OFFICE O/P EST MOD 30 MIN: CPT | Performed by: PSYCHIATRY & NEUROLOGY

## 2023-09-28 RX ORDER — LAMOTRIGINE 200 MG/1
200 TABLET ORAL DAILY
Qty: 90 TABLET | Refills: 0 | Status: SHIPPED | OUTPATIENT
Start: 2023-09-28

## 2023-09-28 RX ORDER — BUPROPION HYDROCHLORIDE 100 MG/1
TABLET ORAL
Qty: 90 TABLET | Refills: 0 | Status: SHIPPED | OUTPATIENT
Start: 2023-09-28

## 2023-09-28 RX ORDER — ESCITALOPRAM OXALATE 20 MG/1
TABLET ORAL
Qty: 90 TABLET | Refills: 0 | Status: SHIPPED | OUTPATIENT
Start: 2023-09-28

## 2023-09-28 RX ORDER — HYDROXYZINE HYDROCHLORIDE 10 MG/1
10 TABLET, FILM COATED ORAL DAILY PRN
Qty: 30 TABLET | Refills: 1 | Status: SHIPPED | OUTPATIENT
Start: 2023-09-28

## 2023-09-28 NOTE — PSYCH
Virtual Regular Visit    Verification of patient location:    Patient is located at Home in the following state in which I hold an active license [place]      Assessment/Plan:    Problem List Items Addressed This Visit    None      Goals addressed in session: Goal 1          Reason for visit is   Chief Complaint   Patient presents with   • Medication Management        Encounter provider Pricilla Colvin MD    Provider located at [address] [place] [address],[address]  [address]  [phone]      Recent Visits  No visits were found meeting these conditions. Showing recent visits within past 7 days and meeting all other requirements  Today's Visits  Date Type Provider Dept   09/28/23 Telemedicine Josuébrianne Colvin, [address] today's visits and meeting all other requirements  Future Appointments  No visits were found meeting these conditions. Showing future appointments within next 150 days and meeting all other requirements       The patient was identified by name and date of birth. Tari Mauro was informed that this is a telemedicine visit and that the visit is being conducted throughthe [employer]. She agrees to proceed. .  My office door was closed. No one else was in the room. She acknowledged consent and understanding of privacy and security of the video platform. The patient has agreed to participate and understands they can discontinue the visit at any time. Patient is aware this is a billable service. Subjective  Tari Mauro is a 24 y.o. female bipolar do and anxiety presents for regular f/u   compliant with meds, denies SE .   Denies acute medical problems or other stressors      HPI   Mood - mosty good and stable; sometimes gets irritable and angry ( controlled); denies anger outbursts  Denies depression or mood swings; does ADLs, functions at baseline  Anxiety - controlled; denies panic attacks      Past Medical History:   Diagnosis Date   • BMI 28.0-28.9,adult 2020   • Depression    • Dysuria     LAST ASSESSED [id number]   • Migraine     LAST ASSESSED [id number]   • Miscarriage 2019   • Ovarian cyst        Past Surgical History:   Procedure Laterality Date   • DILATION AND CURETTAGE OF UTERUS  2019       Current Outpatient Medications   Medication Sig Dispense Refill   • buPROPion (WELLBUTRIN) 100 mg tablet TAKE ONE TABLET BY MOUTH DAILY IN THE MORNING 90 tablet 0   • escitalopram (LEXAPRO) 20 mg tablet Escitalopram 20m tablet po daily 30 tablet 1   • hydrOXYzine HCL (ATARAX) 10 mg tablet Take 1 tablet (10 mg total) by mouth daily as needed for anxiety 30 tablet 1   • lamoTRIgine (LaMICtal) 200 MG tablet Take 1 tablet (200 mg total) by mouth daily 90 tablet 0   • nitrofurantoin (MACROBID) 100 mg capsule Take 1 capsule (100 mg total) by mouth 2 (two) times a day (Patient not taking: Reported on 2022 ) 10 capsule 0   • norgestimate-ethinyl estradiol (ORTHO TRI-CYCLEN,TRINESSA) 0.18/0.215/0.25 MG-35 MCG per tablet      • ondansetron (Zofran ODT) 4 mg disintegrating tablet Take 1 tablet (4 mg total) by mouth every 6 (six) hours as needed for nausea or vomiting (Patient not taking: Reported on 2022 ) 20 tablet 0     No current facility-administered medications for this visit. No Known Allergies    Review of Systems   Constitutional: Negative for activity change and appetite change. Psychiatric/Behavioral: Negative for dysphoric mood, sleep disturbance and suicidal ideas. The patient is not nervous/anxious. Video Exam    There were no vitals filed for this visit. Physical Exam  Constitutional:       Appearance: Normal appearance. She is normal weight. Neurological:      Mental Status: She is alert.    Psychiatric:         Attention and Perception: Attention and perception normal.         Mood and Affect: Mood and affect normal.         Speech: Speech normal.         Behavior: Behavior normal. Behavior is cooperative. Thought Content:  Thought content normal.         Judgment: Judgment normal.          Visit Time    Visit Start Time: 6.18 pm   Visit Stop Time: 6.23 pm   Total Visit Duration: 15 minutes

## 2023-12-14 ENCOUNTER — TELEMEDICINE (OUTPATIENT)
Dept: PSYCHIATRY | Facility: CLINIC | Age: 23
End: 2023-12-14
Payer: COMMERCIAL

## 2023-12-14 DIAGNOSIS — F41.1 GENERALIZED ANXIETY DISORDER: ICD-10-CM

## 2023-12-14 DIAGNOSIS — F41.8 DEPRESSION WITH ANXIETY: ICD-10-CM

## 2023-12-14 DIAGNOSIS — F31.9 BIPOLAR AFFECTIVE DISORDER, REMISSION STATUS UNSPECIFIED (HCC): ICD-10-CM

## 2023-12-14 PROCEDURE — 99214 OFFICE O/P EST MOD 30 MIN: CPT | Performed by: PSYCHIATRY & NEUROLOGY

## 2023-12-14 RX ORDER — BUPROPION HYDROCHLORIDE 100 MG/1
TABLET ORAL
Qty: 90 TABLET | Refills: 0 | Status: SHIPPED | OUTPATIENT
Start: 2023-12-14

## 2023-12-14 RX ORDER — ESCITALOPRAM OXALATE 20 MG/1
TABLET ORAL
Qty: 90 TABLET | Refills: 0 | Status: SHIPPED | OUTPATIENT
Start: 2023-12-14

## 2023-12-14 RX ORDER — LAMOTRIGINE 200 MG/1
200 TABLET ORAL DAILY
Qty: 90 TABLET | Refills: 0 | Status: SHIPPED | OUTPATIENT
Start: 2023-12-14

## 2023-12-14 RX ORDER — HYDROXYZINE HYDROCHLORIDE 10 MG/1
10 TABLET, FILM COATED ORAL DAILY PRN
Qty: 30 TABLET | Refills: 1 | Status: SHIPPED | OUTPATIENT
Start: 2023-12-14

## 2023-12-15 NOTE — PSYCH
Virtual Regular Visit    Verification of patient location:    Patient is located at Home in the following state in which I hold an active license [place]      Assessment/Plan:    Problem List Items Addressed This Visit    None      Goals addressed in session: Goal 1          Reason for visit is   Chief Complaint   Patient presents with    Medication Management        Encounter provider Elle Gardiner MD    Provider located at [address] [place] [address],[address]  [address]  [phone]      Recent Visits  No visits were found meeting these conditions. Showing recent visits within past 7 days and meeting all other requirements  Today's Visits  Date Type Provider Dept   12/14/23 Telemedicine Elle Gardienr, [address] today's visits and meeting all other requirements  Future Appointments  No visits were found meeting these conditions. Showing future appointments within next 150 days and meeting all other requirements       The patient was identified by name and date of birth. Surya Jacques was informed that this is a telemedicine visit and that the visit is being [facility]ought[facility]. She agrees to proceed. .  My office door was closed. No one else was in the room. She acknowledged consent and understanding of privacy and security of the video platform. The patient has agreed to participate and understands they can discontinue the visit at any time. Patient is aware this is a billable service. Subjective  Surya Jacques is a 24 y.o. female with depression and anxiety presents for regular f/u  .   Compliant with meds, denies SE  Denies acute medical problems  Works; lives with parents  Off therapy      HPI   Mood - reports as mostly good and stable; sometimes gets sad - mild, " manageable"; social and active  Anxiety - sometimes feels " panicky: in the morning - meds help; gets better by afternoon    Past Medical History:   Diagnosis Date    BMI 28.0-28.9,adult 2020    Depression     Dysuria     LAST ASSESSED [id number]    Migraine     LAST ASSESSED [id number]    Miscarriage 2019    Ovarian cyst        Past Surgical History:   Procedure Laterality Date    DILATION AND CURETTAGE OF UTERUS  2019       Current Outpatient Medications   Medication Sig Dispense Refill    buPROPion (WELLBUTRIN) 100 mg tablet TAKE ONE TABLET BY MOUTH DAILY IN THE MORNING 90 tablet 0    escitalopram (LEXAPRO) 20 mg tablet Escitalopram 20m tablet po daily 90 tablet 0    hydrOXYzine HCL (ATARAX) 10 mg tablet Take 1 tablet (10 mg total) by mouth daily as needed for anxiety 30 tablet 1    lamoTRIgine (LaMICtal) 200 MG tablet Take 1 tablet (200 mg total) by mouth daily 90 tablet 0    nitrofurantoin (MACROBID) 100 mg capsule Take 1 capsule (100 mg total) by mouth 2 (two) times a day (Patient not taking: Reported on 2022 ) 10 capsule 0    norgestimate-ethinyl estradiol (ORTHO TRI-CYCLEN,TRINESSA) 0.18/0.215/0.25 MG-35 MCG per tablet       ondansetron (Zofran ODT) 4 mg disintegrating tablet Take 1 tablet (4 mg total) by mouth every 6 (six) hours as needed for nausea or vomiting (Patient not taking: Reported on 2022 ) 20 tablet 0     No current facility-administered medications for this visit. No Known Allergies    Review of Systems   Constitutional:  Negative for activity change and appetite change. Psychiatric/Behavioral:  Negative for dysphoric mood, sleep disturbance and suicidal ideas. The patient is not nervous/anxious. Video Exam    There were no vitals filed for this visit. Physical Exam  Constitutional:       Appearance: Normal appearance. She is normal weight. Neurological:      Mental Status: She is alert.    Psychiatric:         Attention and Perception: Attention and perception normal.         Mood and Affect: Mood and affect normal.         Speech: Speech normal.         Behavior: Behavior normal. Behavior is cooperative. Thought Content: Thought content normal.         Judgment: Judgment normal.          Visit Time    Visit Start Time: 6.57 pm   Visit Stop Time: 6.03 pm   Total Visit Duration:  15 minutes    TREATMENT PLAN (Medication Management Only)        [place]    Name and Date of Birth:  Lilly Laird 24 y.o. 2000  Date of Treatment Plan: December 14, 2023  Diagnosis/Diagnoses:  No diagnosis found. Strengths/Personal Resources for Self-Care: taking medications as prescribed, motivation for treatment. Area/Areas of need (in own words): anxiety, depression  1. Long Term Goal: continue improvement in anxiety. Target Date:6 months - 6/14/2024  Person/Persons responsible for completion of goal: Isabel Ayala  2. Short Term Objective (s) - How will we reach this goal?:   A. Provider new recommended medication/dosage changes and/or continue medication(s): continue current medications as prescribed Lexapro, Wellbutrin XL, Lamictal.  B. N/A.  C. N/A. Target Date:6 months - 6/14/2024  Person/Persons Responsible for Completion of Goal: Isabel Ayala  Progress Towards Goals: continuing treatment, improving  Treatment Modality: medication management every 3 months  Review due 180 days from date of this plan: 6 months - 6/14/2024  Expected length of service: ongoing treatment  My Physician/PA/NP and I have developed this plan together and I agree to work on the goals and objectives. I understand the treatment goals that were developed for my treatment. SCDs n/a

## 2024-01-19 ENCOUNTER — OFFICE VISIT (OUTPATIENT)
Dept: FAMILY MEDICINE CLINIC | Facility: HOSPITAL | Age: 24
End: 2024-01-19
Payer: COMMERCIAL

## 2024-01-19 VITALS
DIASTOLIC BLOOD PRESSURE: 74 MMHG | HEART RATE: 73 BPM | WEIGHT: 133.6 LBS | OXYGEN SATURATION: 98 % | SYSTOLIC BLOOD PRESSURE: 104 MMHG | TEMPERATURE: 98.5 F | HEIGHT: 64 IN | BODY MASS INDEX: 22.81 KG/M2

## 2024-01-19 DIAGNOSIS — H10.9 BACTERIAL CONJUNCTIVITIS OF BOTH EYES: Primary | ICD-10-CM

## 2024-01-19 DIAGNOSIS — B96.89 BACTERIAL CONJUNCTIVITIS OF BOTH EYES: Primary | ICD-10-CM

## 2024-01-19 PROCEDURE — 99213 OFFICE O/P EST LOW 20 MIN: CPT | Performed by: FAMILY MEDICINE

## 2024-01-19 RX ORDER — OFLOXACIN 3 MG/ML
1 SOLUTION/ DROPS OPHTHALMIC 4 TIMES DAILY
Qty: 5 ML | Refills: 0 | Status: SHIPPED | OUTPATIENT
Start: 2024-01-19

## 2024-01-19 NOTE — PROGRESS NOTES
Name: Amy Villatoro      : 2000      MRN: [id number]  Encounter Provider: Earnest Gutiérrez MD  Encounter Date: 2024   Encounter department: [facility] [address]     Assessment & Plan     1. Bacterial conjunctivitis of both eyes  -     ofloxacin (OCUFLOX) 0.3 % ophthalmic solution; Administer 1 drop to both eyes 4 (four) times a day           Subjective      Acute visit for possible pink eye  Has oozing, redness and crusting of OD since yesterday  Has since started to work into OS as well  Thinks it was from eye makeup   No fever and no other URI symptoms  I did advise to watch for other signs of COVID as that may be first indicator      Review of Systems   Constitutional:  Negative for fatigue and fever.   HENT: Negative.     Eyes:  Positive for visual disturbance.   Respiratory:  Negative for cough.    Cardiovascular: Negative.    Neurological:  Negative for dizziness, light-headedness and headaches.   All other systems reviewed and are negative.      Current Outpatient Medications on File Prior to Visit   Medication Sig    buPROPion (WELLBUTRIN) 100 mg tablet TAKE ONE TABLET BY MOUTH DAILY IN THE MORNING    escitalopram (LEXAPRO) 20 mg tablet Escitalopram 20m tablet po daily    hydrOXYzine HCL (ATARAX) 10 mg tablet Take 1 tablet (10 mg total) by mouth daily as needed for anxiety    lamoTRIgine (LaMICtal) 200 MG tablet Take 1 tablet (200 mg total) by mouth daily    norgestimate-ethinyl estradiol (ORTHO TRI-CYCLEN,TRINESSA) 0.18/0.215/0.25 MG-35 MCG per tablet     nitrofurantoin (MACROBID) 100 mg capsule Take 1 capsule (100 mg total) by mouth 2 (two) times a day (Patient not taking: Reported on 2022)    ondansetron (Zofran ODT) 4 mg disintegrating tablet Take 1 tablet (4 mg total) by mouth every 6 (six) hours as needed for nausea or vomiting (Patient not taking: Reported on 2022)       Objective     /74 (BP Location: Left arm, Patient Position: Sitting, Cuff  "Size: Standard)   Pulse 73   Temp 98.5 °F (36.9 °C) (Tympanic)   Ht 5' 4\" (1.626 m)   Wt 60.6 kg (133 lb 9.6 oz)   SpO2 98%   BMI 22.93 kg/m²     Physical Exam  Vitals reviewed.   Constitutional:       Appearance: Normal appearance.   HENT:      Right Ear: Tympanic membrane and ear canal normal.      Left Ear: Tympanic membrane and ear canal normal.   Eyes:      General:         Right eye: Discharge present.      Conjunctiva/sclera:      Right eye: Right conjunctiva is injected. Exudate present. No hemorrhage.     Left eye: Left conjunctiva is injected. No exudate or hemorrhage.  Cardiovascular:      Rate and Rhythm: Normal rate and regular rhythm.      Pulses: Normal pulses.      Heart sounds: Normal heart sounds.   Pulmonary:      Effort: Pulmonary effort is normal.      Breath sounds: Normal breath sounds.       Earnest Gutiérrez MD    "

## 2024-02-14 ENCOUNTER — HOSPITAL ENCOUNTER (EMERGENCY)
Facility: HOSPITAL | Age: 24
Discharge: HOME | End: 2024-02-14
Attending: EMERGENCY MEDICINE
Payer: COMMERCIAL

## 2024-02-14 ENCOUNTER — OFFICE VISIT (OUTPATIENT)
Dept: URGENT CARE | Facility: CLINIC | Age: 24
End: 2024-02-14
Payer: COMMERCIAL

## 2024-02-14 VITALS
HEART RATE: 70 BPM | RESPIRATION RATE: 18 BRPM | OXYGEN SATURATION: 99 % | TEMPERATURE: 99.4 F | SYSTOLIC BLOOD PRESSURE: 99 MMHG | DIASTOLIC BLOOD PRESSURE: 54 MMHG

## 2024-02-14 VITALS
OXYGEN SATURATION: 99 % | DIASTOLIC BLOOD PRESSURE: 72 MMHG | HEART RATE: 86 BPM | RESPIRATION RATE: 18 BRPM | SYSTOLIC BLOOD PRESSURE: 103 MMHG | TEMPERATURE: 99.5 F

## 2024-02-14 DIAGNOSIS — H57.89 PERIORBITAL SWELLING: ICD-10-CM

## 2024-02-14 DIAGNOSIS — H10.31 ACUTE BACTERIAL CONJUNCTIVITIS OF RIGHT EYE: Primary | ICD-10-CM

## 2024-02-14 DIAGNOSIS — L03.213 PERIORBITAL CELLULITIS OF RIGHT EYE: Primary | ICD-10-CM

## 2024-02-14 DIAGNOSIS — H10.9 CONJUNCTIVITIS: ICD-10-CM

## 2024-02-14 PROCEDURE — 99284 EMERGENCY DEPT VISIT MOD MDM: CPT

## 2024-02-14 PROCEDURE — 99282 EMERGENCY DEPT VISIT SF MDM: CPT

## 2024-02-14 PROCEDURE — 99213 OFFICE O/P EST LOW 20 MIN: CPT

## 2024-02-14 RX ORDER — OFLOXACIN 3 MG/ML
1 SOLUTION/ DROPS OPHTHALMIC 4 TIMES DAILY
Qty: 10 ML | Refills: 0 | Status: SHIPPED | OUTPATIENT
Start: 2024-02-14 | End: 2024-02-21

## 2024-02-14 RX ORDER — AMOXICILLIN AND CLAVULANATE POTASSIUM 875; 125 MG/1; MG/1
1 TABLET, FILM COATED ORAL EVERY 12 HOURS SCHEDULED
Status: CANCELLED | OUTPATIENT
Start: 2024-02-14

## 2024-02-14 RX ORDER — SULFAMETHOXAZOLE AND TRIMETHOPRIM 800; 160 MG/1; MG/1
1 TABLET ORAL ONCE
Status: COMPLETED | OUTPATIENT
Start: 2024-02-14 | End: 2024-02-14

## 2024-02-14 RX ORDER — TETRACAINE HYDROCHLORIDE 5 MG/ML
1 SOLUTION OPHTHALMIC ONCE
Status: COMPLETED | OUTPATIENT
Start: 2024-02-14 | End: 2024-02-14

## 2024-02-14 RX ORDER — SULFAMETHOXAZOLE AND TRIMETHOPRIM 800; 160 MG/1; MG/1
1 TABLET ORAL 2 TIMES DAILY
Qty: 14 TABLET | Refills: 0 | Status: SHIPPED | OUTPATIENT
Start: 2024-02-15 | End: 2024-02-22

## 2024-02-14 RX ORDER — AMOXICILLIN AND CLAVULANATE POTASSIUM 875; 125 MG/1; MG/1
1 TABLET, FILM COATED ORAL EVERY 12 HOURS
Qty: 14 TABLET | Refills: 0 | Status: SHIPPED | OUTPATIENT
Start: 2024-02-15 | End: 2024-02-22

## 2024-02-14 RX ORDER — OFLOXACIN 3 MG/ML
2 SOLUTION/ DROPS OPHTHALMIC ONCE
Status: COMPLETED | OUTPATIENT
Start: 2024-02-14 | End: 2024-02-14

## 2024-02-14 RX ORDER — AMOXICILLIN AND CLAVULANATE POTASSIUM 875; 125 MG/1; MG/1
1 TABLET, FILM COATED ORAL ONCE
Status: COMPLETED | OUTPATIENT
Start: 2024-02-14 | End: 2024-02-14

## 2024-02-14 RX ADMIN — AMOXICILLIN AND CLAVULANATE POTASSIUM 1 TABLET: 875; 125 TABLET, COATED ORAL at 22:43

## 2024-02-14 RX ADMIN — OFLOXACIN 2 DROP: 3 SOLUTION/ DROPS OPHTHALMIC at 22:44

## 2024-02-14 RX ADMIN — SULFAMETHOXAZOLE AND TRIMETHOPRIM 1 TABLET: 800; 160 TABLET ORAL at 22:43

## 2024-02-14 RX ADMIN — FLUORESCEIN SODIUM 1 STRIP: 1 STRIP OPHTHALMIC at 22:14

## 2024-02-14 RX ADMIN — TETRACAINE HYDROCHLORIDE 1 DROP: 5 SOLUTION OPHTHALMIC at 22:14

## 2024-02-14 NOTE — Clinical Note
Amy Villatoro was seen and treated in our emergency department on 2/14/2024.                Diagnosis:     Amy  is off the rest of the shift today, may return to work on return date.    She may return on this date: 02/17/2024         If you have any questions or concerns, please don't hesitate to call.      MIRIAM Matson    ______________________________           _______________          _______________  Hospital Representative                              Date                                Time

## 2024-02-14 NOTE — PROGRESS NOTES
[facility]        NAME: Amy Villatoro is a 23 y.o. female  : 2000    MRN: [id number]  DATE: 2024  TIME: 1:14 PM    Assessment and Plan   Acute bacterial conjunctivitis of right eye [H10.31]  1. Acute bacterial conjunctivitis of right eye        2. Periorbital swelling  Transfer to other facility            Patient Instructions     You are to go to the ED for further evaluation of your symptoms.    Follow up with your eye doctor and PCP after the ED.      Chief Complaint     Chief Complaint   Patient presents with    Conjunctivitis     Patient has redness, itchiness, and edema of the right eye that started yesterday.          History of Present Illness       23-year-old female presenting with redness and swelling to her right eye x1.5 days. Patient states she did notice some yellow drainage and itchiness. Came in today because the swelling and redness is spreading. Patient states she saw her PCP on  for similar symptoms, diagnosed with conjunctivitis, and prescribed ofloxacin drops which she did use as directed. States she has been using a new pair of contact lenses since stopping ofloxacin drops however she is still using her old contact case. She denies fevers/chills, headaches, dizziness/lightheadedness, blurred vision, floaters/halos, sensitivity to light, and loss of vision.         Review of Systems   Review of Systems   Constitutional:  Negative for chills and fever.   Eyes:  Positive for discharge, redness and itching. Negative for photophobia, pain and visual disturbance.   Respiratory:  Negative for chest tightness and shortness of breath.    Cardiovascular:  Negative for chest pain and palpitations.   Skin:  Positive for color change (redness).   Neurological:  Negative for dizziness, light-headedness and headaches.         Current Medications       Current Outpatient Medications:     amoxicillin-clavulanate (AUGMENTIN) 875-125 mg per tablet, Take 1 tablet by mouth every  12 (twelve) hours for 7 days Do not start before February 15, 2024., Disp: 14 tablet, Rfl: 0    buPROPion (WELLBUTRIN) 100 mg tablet, TAKE ONE TABLET BY MOUTH DAILY IN THE MORNING, Disp: 90 tablet, Rfl: 0    escitalopram (LEXAPRO) 20 mg tablet, Escitalopram 20m tablet po daily, Disp: 90 tablet, Rfl: 0    hydrOXYzine HCL (ATARAX) 10 mg tablet, Take 1 tablet (10 mg total) by mouth daily as needed for anxiety, Disp: 30 tablet, Rfl: 1    lamoTRIgine (LaMICtal) 200 MG tablet, Take 1 tablet (200 mg total) by mouth daily, Disp: 90 tablet, Rfl: 0    nitrofurantoin (MACROBID) 100 mg capsule, Take 1 capsule (100 mg total) by mouth 2 (two) times a day (Patient not taking: Reported on 2022), Disp: 10 capsule, Rfl: 0    norgestimate-ethinyl estradiol (ORTHO TRI-CYCLEN,TRINESSA) 0.18/0.215/0.25 MG-35 MCG per tablet, , Disp: , Rfl:     ofloxacin (OCUFLOX) 0.3 % ophthalmic solution, Administer 1 drop to both eyes 4 (four) times a day for 7 days Apply 1-2 drops every 2-4 hours while awake for the first two days. THEN 4 times daily for 5 days., Disp: 10 mL, Rfl: 0    ondansetron (Zofran ODT) 4 mg disintegrating tablet, Take 1 tablet (4 mg total) by mouth every 6 (six) hours as needed for nausea or vomiting (Patient not taking: Reported on 2022), Disp: 20 tablet, Rfl: 0    sulfamethoxazole-trimethoprim (BACTRIM DS) 800-160 mg per tablet, Take 1 tablet by mouth 2 (two) times a day for 7 days smx-tmp DS (BACTRIM) 800-160 mg tabs (1tab q12 D10) Do not start before February 15, 2024., Disp: 14 tablet, Rfl: 0    Current Allergies     Allergies as of 2024    (No Known Allergies)            The following portions of the patient's history were reviewed and updated as appropriate: allergies, current medications, past family history, past medical history, past social history, past surgical history and problem list.     Past Medical History:   Diagnosis Date    BMI 28.0-28.9,adult 2020    Depression     Dysuria     LAST  ASSESSED [id number]    Migraine     LAST ASSESSED [id number]    Miscarriage 06/2019    Ovarian cyst        Past Surgical History:   Procedure Laterality Date    DILATION AND CURETTAGE OF UTERUS  06/2019       Family History   Problem Relation Age of Onset    Irritable bowel syndrome Mother     Iron deficiency Mother         IRON DEFICIENCY ANEMIA LOWEST 5.8     Diabetes Maternal Grandfather          Medications have been verified.        Objective   /72   Pulse 86   Temp 99.5 °F (37.5 °C)   Resp 18   SpO2 99%        Physical Exam     Physical Exam  Vitals and nursing note reviewed.   Constitutional:       General: She is not in acute distress.     Appearance: She is not ill-appearing.   HENT:      Head: Normocephalic.      Mouth/Throat:      Mouth: Mucous membranes are moist.   Eyes:      General:         Right eye: Discharge present.      Conjunctiva/sclera:      Right eye: Right conjunctiva is injected.      Comments: Swelling to right upper and lower eyelids with erythema that extends into the periorbital area. There is a mild amount of periorbital edema. No tenderness. EOM not painful.    Cardiovascular:      Rate and Rhythm: Normal rate and regular rhythm.      Pulses: Normal pulses.      Heart sounds: Normal heart sounds.   Pulmonary:      Effort: Pulmonary effort is normal.      Breath sounds: Normal breath sounds.   Musculoskeletal:         General: Normal range of motion.      Cervical back: Normal range of motion and neck supple.   Skin:     General: Skin is warm and dry.      Capillary Refill: Capillary refill takes less than 2 seconds.   Neurological:      Mental Status: She is alert and oriented to person, place, and time.

## 2024-02-15 NOTE — ED PROVIDER NOTES
History  Chief Complaint   Patient presents with    Eye Redness     Patient presents to the ED with c/o right eye pain and swelling, states began yesterday. States went to urgent care before here and was told to come to ED.      The patient is a 23-year-old female with no significant PMH presenting for evaluation of 1.5 days of right eye discharge and swelling.  The patient started yesterday evening with white to yellow drainage from the right eye and a scratchy/foreign body sensation.  She notes over the past day it has become more red, with swelling to the upper and lower eyelid with a pink tinge to the surrounding skin.  She was seen in urgent care and sent in for further evaluation.  She denies vision changes including floaters, double vision, blurred vision, and loss of vision.  She wears glasses as well as contact lenses.      History provided by:  Patient and parent   used: No        Prior to Admission Medications   Prescriptions Last Dose Informant Patient Reported? Taking?   buPROPion (WELLBUTRIN) 100 mg tablet   No No   Sig: TAKE ONE TABLET BY MOUTH DAILY IN THE MORNING   escitalopram (LEXAPRO) 20 mg tablet   No No   Sig: Escitalopram 20m tablet po daily   hydrOXYzine HCL (ATARAX) 10 mg tablet   No No   Sig: Take 1 tablet (10 mg total) by mouth daily as needed for anxiety   lamoTRIgine (LaMICtal) 200 MG tablet   No No   Sig: Take 1 tablet (200 mg total) by mouth daily   nitrofurantoin (MACROBID) 100 mg capsule   No No   Sig: Take 1 capsule (100 mg total) by mouth 2 (two) times a day   Patient not taking: Reported on 2022   norgestimate-ethinyl estradiol (ORTHO TRI-CYCLEN,TRINESSA) 0.18/0.215/0.25 MG-35 MCG per tablet   Yes No   ofloxacin (OCUFLOX) 0.3 % ophthalmic solution   No No   Sig: Administer 1 drop to both eyes 4 (four) times a day   ondansetron (Zofran ODT) 4 mg disintegrating tablet   No No   Sig: Take 1 tablet (4 mg total) by mouth every 6 (six) hours as needed for  nausea or vomiting   Patient not taking: Reported on 1/2/2022      Facility-Administered Medications: None       Past Medical History:   Diagnosis Date    BMI 28.0-28.9,adult 1/17/2020    Depression     Dysuria     LAST ASSESSED [id number]    Migraine     LAST ASSESSED [id number]    Miscarriage 06/2019    Ovarian cyst        Past Surgical History:   Procedure Laterality Date    DILATION AND CURETTAGE OF UTERUS  06/2019       Family History   Problem Relation Age of Onset    Irritable bowel syndrome Mother     Iron deficiency Mother         IRON DEFICIENCY ANEMIA LOWEST 5.8     Diabetes Maternal Grandfather      I have reviewed and agree with the history as documented.    E-Cigarette/Vaping    E-Cigarette Use Current Every Day User      E-Cigarette/Vaping Substances    Nicotine Yes     THC No     CBD No     Flavoring Yes     Other No     Unknown No      Social History     Tobacco Use    Smoking status: Light Smoker     Types: Cigarettes     Start date: 2019    Smokeless tobacco: Never   Vaping Use    Vaping status: Every Day    Substances: Nicotine, Flavoring   Substance Use Topics    Alcohol use: No    Drug use: No       Review of Systems   Constitutional:  Negative for chills and fever.   Eyes:  Positive for discharge (Right eye), redness (Right eye, starting in left eye per mom) and itching (Right eye). Negative for photophobia, pain and visual disturbance.   Skin:  Positive for color change (Skin surrounding right eye). Negative for wound.   All other systems reviewed and are negative.      Physical Exam  Physical Exam  Vitals and nursing note reviewed.   Constitutional:       General: She is not in acute distress.     Appearance: Normal appearance. She is well-developed. She is not ill-appearing, toxic-appearing or diaphoretic.   HENT:      Head: Normocephalic and atraumatic.      Nose: Nose normal.      Mouth/Throat:      Mouth: Mucous membranes are moist.      Pharynx: Oropharynx is clear.   Eyes:      General:  Lids are normal. Lids are everted, no foreign bodies appreciated. Vision grossly intact. Gaze aligned appropriately. No visual field deficit.        Right eye: Discharge present. No foreign body.         Left eye: No foreign body or discharge.      Intraocular pressure: Right eye pressure is 8 mmHg. Left eye pressure is 8 mmHg. Measurements were taken using a handheld tonometer.     Extraocular Movements: Extraocular movements intact.      Right eye: No nystagmus.      Left eye: No nystagmus.      Conjunctiva/sclera:      Right eye: Right conjunctiva is injected. No chemosis, exudate or hemorrhage.     Left eye: Left conjunctiva is injected. No chemosis, exudate or hemorrhage.     Pupils: Pupils are equal, round, and reactive to light.      Right eye: No corneal abrasion or fluorescein uptake. Jimmy exam negative.      Left eye: No corneal abrasion or fluorescein uptake. Jimmy exam negative.     Funduscopic exam:     Right eye: Red reflex present.         Left eye: Red reflex present.     Visual Fields: Right eye visual fields normal and left eye visual fields normal.   Cardiovascular:      Rate and Rhythm: Normal rate.   Pulmonary:      Effort: Pulmonary effort is normal. No respiratory distress.   Musculoskeletal:         General: Normal range of motion.      Cervical back: Normal range of motion and neck supple.   Skin:     General: Skin is warm and dry.      Capillary Refill: Capillary refill takes less than 2 seconds.      Findings: Erythema (Mild erythema surrounding right eye consistent with periorbital cellulitis) present.   Neurological:      General: No focal deficit present.      Mental Status: She is alert and oriented to person, place, and time. Mental status is at baseline.         Vital Signs  ED Triage Vitals [02/14/24 1615]   Temperature Pulse Respirations Blood Pressure SpO2   99.4 °F (37.4 °C) 85 18 130/60 98 %      Temp Source Heart Rate Source Patient Position - Orthostatic VS BP Location FiO2  (%)   Temporal Monitor Sitting Right arm --      Pain Score       No Pain           Vitals:    02/14/24 1615 02/14/24 2249   BP: 130/60 99/54   Pulse: 85 70   Patient Position - Orthostatic VS: Sitting Sitting         Visual Acuity  Visual Acuity      Flowsheet Row Most Recent Value   Visual acuity R eye is 20/20   Visual acuity Left eye is 20/20   Visual acuity in both eyes is 20/20   Wearing corrective eyewear/lenses? Yes   L Pupil Size (mm) 3   R Pupil Size (mm) 3   L Pupil Shape Round   R Pupil Shape Round            ED Medications  Medications   tetracaine 0.5 % ophthalmic solution 1 drop (1 drop Both Eyes Given 2/14/24 2214)   fluorescein sodium sterile ophthalmic strip 1 strip (1 strip Right Eye Given 2/14/24 2214)   amoxicillin-clavulanate (AUGMENTIN) 875-125 mg per tablet 1 tablet (1 tablet Oral Given 2/14/24 2243)   sulfamethoxazole-trimethoprim (BACTRIM DS) 800-160 mg per tablet 1 tablet (1 tablet Oral Given 2/14/24 2243)   ofloxacin (OCUFLOX) 0.3 % ophthalmic solution 2 drop (2 drops Both Eyes Given 2/14/24 2244)       Diagnostic Studies  Results Reviewed       None                   No orders to display              Procedures  Procedures         ED Course                               SBIRT 20yo+      Flowsheet Row Most Recent Value   Initial Alcohol Screen: US AUDIT-C     1. How often do you have a drink containing alcohol? 0 Filed at: 02/14/2024 1617   2. How many drinks containing alcohol do you have on a typical day you are drinking?  0 Filed at: 02/14/2024 1617   3a. Male UNDER 65: How often do you have five or more drinks on one occasion? 0 Filed at: 02/14/2024 1617   3b. FEMALE Any Age, or MALE 65+: How often do you have 4 or more drinks on one occassion? 0 Filed at: 02/14/2024 1617   Audit-C Score 0 Filed at: 02/14/2024 1617   DM: How many times in the past year have you...    Used an illegal drug or used a prescription medication for non-medical reasons? Never Filed at: 02/14/2024 1617                       Medical Decision Making  DDx including but not limited to: Conjunctivitis, corneal abrasion, corneal ulcer, corneal foreign body, periorbital cellulitis; consider but doubt orbital cellulitis    Visual acuity obtained and 20/20 with corrective lenses.  Right eye injected with sticky yellow discharge consistent with conjunctivitis.  Erythema to the skin surrounding the right eye consistent with periorbital cellulitis.  No corneal abrasion or ulcer on examination.  Ocular pressures normal.  No pain with ocular movements.  No fevers, no neck pain.  Plan made for ocular antibiotics as well as oral antibiotics to treat preseptal/periorbital cellulitis.  Will put in referral for close ophthalmology follow-up.  Patient in agreement with plan and has no further questions at this time.  She appears well, in no acute distress, and is amatory with steady gait at time discharge.    Problems Addressed:  Conjunctivitis: acute illness or injury  Periorbital cellulitis of right eye: acute illness or injury    Amount and/or Complexity of Data Reviewed  Independent Historian: parent    Risk  OTC drugs.  Prescription drug management.             Disposition  Final diagnoses:   Periorbital cellulitis of right eye   Conjunctivitis     Time reflects when diagnosis was documented in both MDM as applicable and the Disposition within this note       Time User Action Codes Description Comment    2/14/2024 10:36 PM Ani Denny [L03.213] Periorbital cellulitis of right eye     2/14/2024 10:36 PM Ani Denny [H10.9] Conjunctivitis           ED Disposition       ED Disposition   Discharge    Condition   Stable    Date/Time   Wed Feb 14, 2024 2236    Comment   Amy Villatoro discharge to home/self care.                   Follow-up Information       Follow up With Specialties Details Why Contact Info Additional Information    Moncho Irby MD Ophthalmology Schedule an appointment as soon as possible for a visit  in 2 days  [address] [address]  [phone]        [facility] Emergency Medicine Go to  If symptoms worsen [address] [facility] [address] [phone] [facility], [address] [place], [place] [address]            Discharge Medication List as of 2024 10:46 PM        START taking these medications    Details   amoxicillin-clavulanate (AUGMENTIN) 875-125 mg per tablet Take 1 tablet by mouth every 12 (twelve) hours for 7 days Do not start before February 15, 2024., Starting Thu 2/15/2024, Until Thu 2024, Normal      sulfamethoxazole-trimethoprim (BACTRIM DS) 800-160 mg per tablet Take 1 tablet by mouth 2 (two) times a day for 7 days smx-tmp DS (BACTRIM) 800-160 mg tabs (1tab q12 D10) Do not start before February 15, 2024., Starting Thu 2/15/2024, Until Thu 2024, Normal           CONTINUE these medications which have CHANGED    Details   ofloxacin (OCUFLOX) 0.3 % ophthalmic solution Administer 1 drop to both eyes 4 (four) times a day for 7 days Apply 1-2 drops every 2-4 hours while awake for the first two days. THEN 4 times daily for 5 days., Starting 2024, Until 2024, Normal           CONTINUE these medications which have NOT CHANGED    Details   buPROPion (WELLBUTRIN) 100 mg tablet TAKE ONE TABLET BY MOUTH DAILY IN THE MORNING, Normal      escitalopram (LEXAPRO) 20 mg tablet Escitalopram 20m tablet po daily, Normal      hydrOXYzine HCL (ATARAX) 10 mg tablet Take 1 tablet (10 mg total) by mouth daily as needed for anxiety, Starting Thu 2023, Normal      lamoTRIgine (LaMICtal) 200 MG tablet Take 1 tablet (200 mg total) by mouth daily, Starting Thu 2023, Normal      nitrofurantoin (MACROBID) 100 mg capsule Take 1 capsule (100 mg total) by mouth 2 (two) times a day, Starting Wed 2021, Normal       norgestimate-ethinyl estradiol (ORTHO TRI-CYCLEN,TRINESSA) 0.18/0.215/0.25 MG-35 MCG per tablet Starting Tue 1/23/2018, Historical Med      ondansetron (Zofran ODT) 4 mg disintegrating tablet Take 1 tablet (4 mg total) by mouth every 6 (six) hours as needed for nausea or vomiting, Starting Wed 12/22/2021, Normal                 PDMP Review       None            ED Provider  Electronically Signed by             MIRIAM Matson  02/15/24 0016

## 2024-02-15 NOTE — DISCHARGE INSTRUCTIONS
I have placed a referral to ophthalmology me so you may follow-up closely.  To the prescribed antibiotics as directed for the full duration of their course.  Return to the ER if develop fever, neck pain or stiffness, vision loss, inability to see, pain with movement of your eye, vomiting, weakness, confusion, or lethargy.

## 2024-02-16 NOTE — PATIENT INSTRUCTIONS
You are to go to the ED for further evaluation of your symptoms.    Follow up with your eye doctor and PCP after the ED.

## 2024-02-21 PROBLEM — B96.89 BACTERIAL CONJUNCTIVITIS OF BOTH EYES: Status: RESOLVED | Noted: 2024-01-19 | Resolved: 2024-02-21

## 2024-02-21 PROBLEM — H10.9 BACTERIAL CONJUNCTIVITIS OF BOTH EYES: Status: RESOLVED | Noted: 2024-01-19 | Resolved: 2024-02-21

## 2024-03-14 ENCOUNTER — TELEMEDICINE (OUTPATIENT)
Dept: PSYCHIATRY | Facility: CLINIC | Age: 24
End: 2024-03-14
Payer: COMMERCIAL

## 2024-03-14 DIAGNOSIS — F31.9 BIPOLAR AFFECTIVE DISORDER, REMISSION STATUS UNSPECIFIED (HCC): ICD-10-CM

## 2024-03-14 DIAGNOSIS — F41.1 GENERALIZED ANXIETY DISORDER: ICD-10-CM

## 2024-03-14 PROCEDURE — 99214 OFFICE O/P EST MOD 30 MIN: CPT | Performed by: PSYCHIATRY & NEUROLOGY

## 2024-03-14 RX ORDER — BUPROPION HYDROCHLORIDE 100 MG/1
TABLET ORAL
Qty: 90 TABLET | Refills: 0 | Status: SHIPPED | OUTPATIENT
Start: 2024-03-14

## 2024-03-14 RX ORDER — LAMOTRIGINE 200 MG/1
200 TABLET ORAL DAILY
Qty: 90 TABLET | Refills: 0 | Status: SHIPPED | OUTPATIENT
Start: 2024-03-14

## 2024-03-14 RX ORDER — HYDROXYZINE HYDROCHLORIDE 10 MG/1
10 TABLET, FILM COATED ORAL DAILY PRN
Qty: 30 TABLET | Refills: 1 | Status: SHIPPED | OUTPATIENT
Start: 2024-03-14

## 2024-03-14 RX ORDER — ESCITALOPRAM OXALATE 20 MG/1
TABLET ORAL
Qty: 90 TABLET | Refills: 0 | Status: SHIPPED | OUTPATIENT
Start: 2024-03-14

## 2024-03-14 NOTE — PSYCH
Virtual Regular Visit    Verification of patient location:    Patient is located at Home in the following state in which I hold an active license [place]      Assessment/Plan:    Problem List Items Addressed This Visit    None      Goals addressed in session: Goal 1          Reason for visit is   Chief Complaint   Patient presents with    Medication Management        Encounter provider Tiffanie Castorena MD    Provider located at [facility]  [address] [facility] [id number] [phone]      Recent Visits  No visits were found meeting these conditions.  Showing recent visits within past 7 days and meeting all other requirements  Today's Visits  Date Type Provider Dept   03/14/24 Telemedicine Tiffanie Castorena MD [facility]   Showing today's visits and meeting all other requirements  Future Appointments  No visits were found meeting these conditions.  Showing future appointments within next 150 days and meeting all other requirements       The patient was identified by name and date of birth. Amy Villatoro was informed that this is a telemedicine visit and that the visit is being conducted throughthe Epic Embedded platform. She agrees to proceed..  My office door was closed. No one else was in the room.  She acknowledged consent and understanding of privacy and security of the video platform. The patient has agreed to participate and understands they can discontinue the visit at any time.    Patient is aware this is a billable service.     Subjective  Amy Villatoro is a 24 y.o. female with depression and anxiety presents for regular f/u  .  Compliant with meds, denies SE  Recent ER visit for eye infection - improved  Pt is , lives with parents; got a car   Pt works in [employer]  Pt has 4 cats; 1 cat was sick recently - recovered      HPI   Mood - reports as good and stable; social and active, good energy and motivation; does ADLs  Anxiety -  controlled; denies panic attacks or racing thoughts   Past Medical History:   Diagnosis Date    BMI 28.0-28.9,adult 2020    Depression     Dysuria     LAST ASSESSED [id number]    Migraine     LAST ASSESSED [id number]    Miscarriage 2019    Ovarian cyst        Past Surgical History:   Procedure Laterality Date    DILATION AND CURETTAGE OF UTERUS  2019       Current Outpatient Medications   Medication Sig Dispense Refill    buPROPion (WELLBUTRIN) 100 mg tablet TAKE ONE TABLET BY MOUTH DAILY IN THE MORNING 90 tablet 0    escitalopram (LEXAPRO) 20 mg tablet Escitalopram 20m tablet po daily 90 tablet 0    hydrOXYzine HCL (ATARAX) 10 mg tablet Take 1 tablet (10 mg total) by mouth daily as needed for anxiety 30 tablet 1    lamoTRIgine (LaMICtal) 200 MG tablet Take 1 tablet (200 mg total) by mouth daily 90 tablet 0    nitrofurantoin (MACROBID) 100 mg capsule Take 1 capsule (100 mg total) by mouth 2 (two) times a day (Patient not taking: Reported on 2022) 10 capsule 0    norgestimate-ethinyl estradiol (ORTHO TRI-CYCLEN,TRINESSA) 0.18/0.215/0.25 MG-35 MCG per tablet       ondansetron (Zofran ODT) 4 mg disintegrating tablet Take 1 tablet (4 mg total) by mouth every 6 (six) hours as needed for nausea or vomiting (Patient not taking: Reported on 2022) 20 tablet 0     No current facility-administered medications for this visit.        No Known Allergies    Review of Systems   Constitutional:  Negative for activity change and appetite change.   Psychiatric/Behavioral:  Negative for dysphoric mood, sleep disturbance and suicidal ideas. The patient is not nervous/anxious.        Video Exam    There were no vitals filed for this visit.    Physical Exam  Constitutional:       Appearance: Normal appearance. She is normal weight.   Neurological:      Mental Status: She is alert.   Psychiatric:         Attention and Perception: Attention and perception normal.         Mood and Affect: Mood and affect normal.          Speech: Speech normal.         Behavior: Behavior normal. Behavior is cooperative.         Thought Content: Thought content normal.         Judgment: Judgment normal.          Visit Time    Visit Start Time: 6.57 pm   Visit Stop Time: 7.05 pm   Total Visit Duration:  20 minutes

## 2024-06-03 ENCOUNTER — TELEMEDICINE (OUTPATIENT)
Dept: PSYCHIATRY | Facility: CLINIC | Age: 24
End: 2024-06-03
Payer: COMMERCIAL

## 2024-06-03 DIAGNOSIS — F31.9 BIPOLAR AFFECTIVE DISORDER, REMISSION STATUS UNSPECIFIED (HCC): ICD-10-CM

## 2024-06-03 DIAGNOSIS — F41.1 GENERALIZED ANXIETY DISORDER: ICD-10-CM

## 2024-06-03 PROCEDURE — 99214 OFFICE O/P EST MOD 30 MIN: CPT | Performed by: PSYCHIATRY & NEUROLOGY

## 2024-06-03 RX ORDER — HYDROXYZINE HYDROCHLORIDE 10 MG/1
10 TABLET, FILM COATED ORAL DAILY PRN
Qty: 30 TABLET | Refills: 1 | Status: SHIPPED | OUTPATIENT
Start: 2024-06-03

## 2024-06-03 RX ORDER — BUPROPION HYDROCHLORIDE 100 MG/1
TABLET ORAL
Qty: 90 TABLET | Refills: 0 | Status: SHIPPED | OUTPATIENT
Start: 2024-06-03

## 2024-06-03 RX ORDER — ESCITALOPRAM OXALATE 20 MG/1
TABLET ORAL
Qty: 90 TABLET | Refills: 0 | Status: SHIPPED | OUTPATIENT
Start: 2024-06-03

## 2024-06-03 RX ORDER — LAMOTRIGINE 200 MG/1
200 TABLET ORAL DAILY
Qty: 90 TABLET | Refills: 0 | Status: SHIPPED | OUTPATIENT
Start: 2024-06-03

## 2024-06-03 NOTE — PSYCH
Virtual Regular Visit    Verification of patient location:    Patient is located at Home in the following state in which I hold an active license [place]      Assessment/Plan:    Problem List Items Addressed This Visit    None      Goals addressed in session: Goal 1          Reason for visit is   Chief Complaint   Patient presents with    Medication Management        Encounter provider Tiffanie Castorena MD      Recent Visits  No visits were found meeting these conditions.  Showing recent visits within past 7 days and meeting all other requirements  Today's Visits  Date Type Provider Dept   06/03/24 Telemedicine Tiffanie Castorena MD [facility]   Showing today's visits and meeting all other requirements  Future Appointments  No visits were found meeting these conditions.  Showing future appointments within next 150 days and meeting all other requirements       The patient was identified by name and date of birth. Amy Villatoro was informed that this is a telemedicine visit and that the visit is being conducted throughthe Epic Embedded platform. She agrees to proceed..  My office door was closed. No one else was in the room.  She acknowledged consent and understanding of privacy and security of the video platform. The patient has agreed to participate and understands they can discontinue the visit at any time.    Patient is aware this is a billable service.     Subjective  Amy Villatoro is a 24 y.o. female with depression and anxiety presents for regular f/u  .  Compliant with meds, denies SE  Denies acute medical problems  Works; lives with parents; sees  daily    HPI   Mood - reports as good and stable; denies depression or mood swings. Active and social; does ADLs  Anxiety - controlled; denies panic attacks or racing thoughts  Past Medical History:   Diagnosis Date    BMI 28.0-28.9,adult 1/17/2020    Depression     Dysuria     LAST ASSESSED [id number]    Migraine     LAST ASSESSED [id number]    Miscarriage  2019    Ovarian cyst        Past Surgical History:   Procedure Laterality Date    DILATION AND CURETTAGE OF UTERUS  2019       Current Outpatient Medications   Medication Sig Dispense Refill    norgestimate-ethinyl estradiol (ORTHO TRI-CYCLEN,TRINESSA) 0.18/0.215/0.25 MG-35 MCG per tablet       buPROPion (WELLBUTRIN) 100 mg tablet TAKE ONE TABLET BY MOUTH DAILY IN THE MORNING 90 tablet 0    escitalopram (LEXAPRO) 20 mg tablet Escitalopram 20m tablet po daily 90 tablet 0    hydrOXYzine HCL (ATARAX) 10 mg tablet Take 1 tablet (10 mg total) by mouth daily as needed for anxiety 30 tablet 1    lamoTRIgine (LaMICtal) 200 MG tablet Take 1 tablet (200 mg total) by mouth daily 90 tablet 0    nitrofurantoin (MACROBID) 100 mg capsule Take 1 capsule (100 mg total) by mouth 2 (two) times a day (Patient not taking: Reported on 2022) 10 capsule 0    ondansetron (Zofran ODT) 4 mg disintegrating tablet Take 1 tablet (4 mg total) by mouth every 6 (six) hours as needed for nausea or vomiting (Patient not taking: Reported on 2022) 20 tablet 0     No current facility-administered medications for this visit.        No Known Allergies    Review of Systems   Constitutional:  Negative for activity change and appetite change.   Psychiatric/Behavioral:  Negative for dysphoric mood, sleep disturbance and suicidal ideas. The patient is not nervous/anxious.        Video Exam    There were no vitals filed for this visit.    Physical Exam  Constitutional:       Appearance: Normal appearance. She is normal weight.   Neurological:      Mental Status: She is alert.   Psychiatric:         Attention and Perception: Attention and perception normal.         Mood and Affect: Mood and affect normal.         Speech: Speech normal.         Behavior: Behavior normal. Behavior is cooperative.         Thought Content: Thought content normal.         Judgment: Judgment normal.          Visit Time    Visit Start Time: 1.27 pm   Visit Stop Time:  1.33 pm   Total Visit Duration:  20 minutes  TREATMENT PLAN (Medication Management Only)        [facility]    Name and Date of Birth:  Amy Villatoro 24 y.o. 2000  Date of Treatment Plan: Catia 3, 2024  Diagnosis/Diagnoses:  No diagnosis found.  Strengths/Personal Resources for Self-Care: supportive family, taking medications as prescribed.  Area/Areas of need (in own words): anxiety, depression  1. Long Term Goal: maintain control of depression.  Target Date:6 months - 12/3/2024  Person/Persons responsible for completion of goal: Amy  2.  Short Term Objective (s) - How will we reach this goal?:   A. Provider new recommended medication/dosage changes and/or continue medication(s): continue current medications as prescribed Lexapro, Wellbutrin XL, Lamictal.  B. N/A.  C. N/A.  Target Date:6 months - 12/3/2024  Person/Persons Responsible for Completion of Goal: Amy  Progress Towards Goals: stable  Treatment Modality: medication management every 3 months  Review due 180 days from date of this plan: 6 months - 12/3/2024  Expected length of service: ongoing treatment  My Physician/PA/NP and I have developed this plan together and I agree to work on the goals and objectives. I understand the treatment goals that were developed for my treatment.

## 2024-06-17 ENCOUNTER — TELEPHONE (OUTPATIENT)
Dept: FAMILY MEDICINE CLINIC | Facility: HOSPITAL | Age: 24
End: 2024-06-17

## 2024-06-17 ENCOUNTER — OFFICE VISIT (OUTPATIENT)
Dept: FAMILY MEDICINE CLINIC | Facility: HOSPITAL | Age: 24
End: 2024-06-17
Payer: COMMERCIAL

## 2024-06-17 VITALS
DIASTOLIC BLOOD PRESSURE: 64 MMHG | BODY MASS INDEX: 25.33 KG/M2 | SYSTOLIC BLOOD PRESSURE: 110 MMHG | WEIGHT: 148.4 LBS | TEMPERATURE: 97.8 F | HEIGHT: 64 IN | HEART RATE: 81 BPM

## 2024-06-17 DIAGNOSIS — Z00.00 ANNUAL PHYSICAL EXAM: Primary | ICD-10-CM

## 2024-06-17 DIAGNOSIS — F41.8 DEPRESSION WITH ANXIETY: ICD-10-CM

## 2024-06-17 PROCEDURE — 99395 PREV VISIT EST AGE 18-39: CPT | Performed by: NURSE PRACTITIONER

## 2024-06-17 NOTE — PATIENT INSTRUCTIONS
Wellness Visit for Adults   AMBULATORY CARE:   A wellness visit  is when you see your healthcare provider to get screened for health problems. Your healthcare provider will also give you advice on how to stay healthy. Write down your questions so you remember to ask them. Ask your healthcare provider how often you should have a wellness visit.  What happens at a wellness visit:  Your healthcare provider will ask about your health, and your family history of health problems. This includes high blood pressure, heart disease, and cancer. He or she will ask if you have symptoms that concern you, if you smoke, and about your mood. You may also be asked about your intake of medicines, supplements, food, and alcohol. Any of the following may be done:  Your weight  will be checked. Your height may also be checked so your body mass index (BMI) can be calculated. Your BMI shows if you are at a healthy weight.    Your blood pressure  and heart rate will be checked. Your temperature may also be checked.    Blood and urine tests  may be done. Blood tests may be done to check your cholesterol levels. Abnormal cholesterol levels increase your risk for heart disease and stroke. You may also need a blood or urine test to check for diabetes if you are at increased risk. Urine tests may be done to look for signs of an infection or kidney disease.    A physical exam  includes checking your heartbeat and lungs with a stethoscope. Your healthcare provider may also check your skin to look for sun damage.    Screening tests  may be recommended. A screening test is done to check for diseases that may not cause symptoms. The screening tests you may need depend on your age, gender, family history, and lifestyle habits. For example, colorectal screening may be recommended if you are 50 years old or older.    Screening tests you need if you are a woman:   A Pap smear  is used to screen for cervical cancer. Pap smears are usually done every 3 to  5 years depending on your age. You may need them more often if you have had abnormal Pap smear test results in the past. Ask your healthcare provider how often you should have a Pap smear.    A mammogram  is an x-ray of your breasts to screen for breast cancer. Experts recommend mammograms every 2 years starting at age 50 years. You may need a mammogram at age 49 years or younger if you have an increased risk for breast cancer. Talk to your healthcare provider about when you should start having mammograms and how often you need them.    Vaccines you may need:   Get an influenza vaccine  every year. The influenza vaccine protects you from the flu. Several types of viruses cause the flu. The viruses change over time, so new vaccines are made each year.    Get a tetanus-diphtheria (Td) booster vaccine  every 10 years. This vaccine protects you against tetanus and diphtheria. Tetanus is a severe infection that may cause painful muscle spasms and lockjaw. Diphtheria is a severe bacterial infection that causes a thick covering in the back of your mouth and throat.    Get a human papillomavirus (HPV) vaccine  if you are female and aged 19 to 26 or male 19 to 21 and never received it. This vaccine protects you from HPV infection. HPV is the most common infection spread by sexual contact. HPV may also cause vaginal, penile, and anal cancers.    Get a pneumococcal vaccine  if you are aged 65 years or older. The pneumococcal vaccine is an injection given to protect you from pneumococcal disease. Pneumococcal disease is an infection caused by pneumococcal bacteria. The infection may cause pneumonia, meningitis, or an ear infection.    Get a shingles vaccine  if you are 60 or older, even if you have had shingles before. The shingles vaccine is an injection to protect you from the varicella-zoster virus. This is the same virus that causes chickenpox. Shingles is a painful rash that develops in people who had chickenpox or have  been exposed to the virus.    How to eat healthy:  My Plate is a model for planning healthy meals. It shows the types and amounts of foods that should go on your plate. Fruits and vegetables make up about half of your plate, and grains and protein make up the other half. A serving of dairy is included on the side of your plate. The amount of calories and serving sizes you need depends on your age, gender, weight, and height. Examples of healthy foods are listed below:  Eat a variety of vegetables  such as dark green, red, and orange vegetables. You can also include canned vegetables low in sodium (salt) and frozen vegetables without added butter or sauces.    Eat a variety of fresh fruits , canned fruit in 100% juice, frozen fruit, and dried fruit.    Include whole grains.  At least half of the grains you eat should be whole grains. Examples include whole-wheat bread, wheat pasta, brown rice, and whole-grain cereals such as oatmeal.    Eat a variety of protein foods such as seafood (fish and shellfish), lean meat, and poultry without skin (turkey and chicken). Examples of lean meats include pork leg, shoulder, or tenderloin, and beef round, sirloin, tenderloin, and extra lean ground beef. Other protein foods include eggs and egg substitutes, beans, peas, soy products, nuts, and seeds.    Choose low-fat dairy products such as skim or 1% milk or low-fat yogurt, cheese, and cottage cheese.    Limit unhealthy fats  such as butter, hard margarine, and shortening.       Exercise:  Exercise at least 30 minutes per day on most days of the week. Some examples of exercise include walking, biking, dancing, and swimming. You can also fit in more physical activity by taking the stairs instead of the elevator or parking farther away from stores. Include muscle strengthening activities 2 days each week. Regular exercise provides many health benefits. It helps you manage your weight, and decreases your risk for type 2 diabetes,  heart disease, stroke, and high blood pressure. Exercise can also help improve your mood. Ask your healthcare provider about the best exercise plan for you.       General health and safety guidelines:   Do not smoke.  Nicotine and other chemicals in cigarettes and cigars can cause lung damage. Ask your healthcare provider for information if you currently smoke and need help to quit. E-cigarettes or smokeless tobacco still contain nicotine. Talk to your healthcare provider before you use these products.    Limit alcohol.  A drink of alcohol is 12 ounces of beer, 5 ounces of wine, or 1½ ounces of liquor.    Lose weight, if needed.  Being overweight increases your risk of certain health conditions. These include heart disease, high blood pressure, type 2 diabetes, and certain types of cancer.    Protect your skin.  Do not sunbathe or use tanning beds. Use sunscreen with a SPF 15 or higher. Apply sunscreen at least 15 minutes before you go outside. Reapply sunscreen every 2 hours. Wear protective clothing, hats, and sunglasses when you are outside.    Drive safely.  Always wear your seatbelt. Make sure everyone in your car wears a seatbelt. A seatbelt can save your life if you are in an accident. Do not use your cell phone when you are driving. This could distract you and cause an accident. Pull over if you need to make a call or send a text message.    Practice safe sex.  Use latex condoms if are sexually active and have more than one partner. Your healthcare provider may recommend screening tests for sexually transmitted infections (STIs).    Wear helmets, lifejackets, and protective gear.  Always wear a helmet when you ride a bike or motorcycle, go skiing, or play sports that could cause a head injury. Wear protective equipment when you play sports. Wear a lifejacket when you are on a boat or doing water sports.    © Copyright [employer] 2023 Information is for End User's use only and may not be sold, redistributed or  otherwise used for commercial purposes.  The above information is an  only. It is not intended as medical advice for individual conditions or treatments. Talk to your doctor, nurse or pharmacist before following any medical regimen to see if it is safe and effective for you.    Depression   AMBULATORY CARE:   Depression  is a mood disorder that causes feelings of sadness or hopelessness that do not go away. Depression may cause you to lose interest in things you used to enjoy. These feelings may interfere with your daily life.  Common signs and symptoms:   Appetite changes, or weight gain or loss    Trouble falling or staying asleep, or sleeping too much    Fatigue (being mentally and physically tired) or lack of energy    Feeling restless, irritable, or withdrawn    Feeling worthless, hopeless, discouraged, or guilty    Trouble concentrating, remembering things, doing daily tasks, or making decisions    Thoughts about hurting or killing yourself    Call your local emergency number (911 in the ) if:   You think about hurting yourself or someone else.    You have done something on purpose to hurt yourself.    Call your therapist or doctor if:   Your symptoms get worse or do not get better with treatment.    Your depression keeps you from doing your regular daily activities.    You have new symptoms since your last visit.    You have questions or concerns about your condition or care.    The following resources are available at any time to help you, if needed:   Contact a suicide prevention organization:        For the [employer] Suicide and Crisis Lifeline:     Call or text [employer]     Send a chat on https://[employer].org/chat     Call 6-352-473-1507 (1-800-273-TALK)    For the Suicide Hotline, call 1-360.333.1699 (4-549-KUAKWQN)    For a list of international numbers: https://save.org/find-help/international-resources/  Treatment for depression  depends on how severe your symptoms are. You may need any of  the following:  Cognitive behavioral therapy (CBT)  teaches you how to identify and change negative thought patterns.    Antidepressant medicine  may be given to decrease or manage symptoms. You may need to take this medicine for several weeks before they start working.    Self-care:   Talk to someone about your depression.  Your healthcare provider may suggest counseling. You might feel more comfortable talking with a friend or family member about your depression. Choose someone you know will be supportive and encouraging.    Get regular physical activity.  Physical activity can lower your stress, improve your mood, and help you sleep better. Work with your healthcare provider to develop a plan that you enjoy.         Create a regular sleep schedule.  A routine can help you relax before bed. Listen to music, read, or do yoga. Try to go to bed and wake up at the same time every day. Sleep is important for emotional health.    Eat a variety of healthy foods.  Healthy foods include fruits, vegetables, whole-grain breads, low-fat dairy products, lean meats, fish, and cooked beans. A healthy meal plan is low in fat, salt, and added sugar.         Do not use alcohol, drugs, or nicotine products.  These can worsen depression or make it hard to manage. Talk to your therapist or healthcare provider if you use any of these products and need help to quit.    Follow up with your therapist or doctor as directed:  Your healthcare provider will monitor your progress at follow-up visits. Your provider will also monitor your medicine if you take antidepressants and ask if the medicine is helping. Tell your provider about any side effects or problems you have with your medicine. The type or amount of medicine may need to be changed. Write down your questions so you remember to ask them during your visits.  For more information or support:   [employer] [facility] [employer]  [address]  [place]  [address]  Phone: 6- 206 - 237-7356  Phone: 4- 165 - 107-8289  Web Address: http://www.neeta.[employer]  [facility] Suicide and Crisis Lifeline  [address]  [place] [address]  Phone: 8- 367 - 071  Web Address: http://www.suicidepreventionlifeline.org OR https://[employer]/chat/    © Copyright [employer] 2023 Information is for End User's use only and may not be sold, redistributed or otherwise used for commercial purposes.  The above information is an  only. It is not intended as medical advice for individual conditions or treatments. Talk to your doctor, nurse or pharmacist before following any medical regimen to see if it is safe and effective for you.

## 2024-06-17 NOTE — PROGRESS NOTES
Adult Annual Physical  Name: Amy Villatoro      : 2000      MRN: [id number]  Encounter Provider: MIRIAM De La Vega  Encounter Date: 2024   Encounter department: [facility] [address]     Assessment & Plan   1. Annual physical exam  Comments:  PE updated and permit form completed; return for next PE in 1 year, continue gyn f/u as recommended  2. Depression with anxiety  Assessment & Plan:  Mood stable on meds as rx'd by psych  Continue f/u as recommended    Immunizations and preventive care screenings were discussed with patient today. Appropriate education was printed on patient's after visit summary.    Counseling:  Alcohol/drug use: discussed moderation in alcohol intake, the recommendations for healthy alcohol use, and avoidance of illicit drug use.  Dental Health: discussed importance of regular tooth brushing, flossing, and dental visits.  Injury prevention: discussed safety/seat belts, safety helmets, smoke detectors, carbon dioxide detectors, and smoking near bedding or upholstery.  Sexual health: discussed sexually transmitted diseases, partner selection, use of condoms, avoidance of unintended pregnancy, and contraceptive alternatives.  Exercise: the importance of regular exercise/physical activity was discussed. Recommend exercise 3-5 times per week for at least 30 minutes.       Depression Screening and Follow-up Plan: Patient's depression screening was positive with a PHQ-9 score of 10. Continue regular follow-up with their mental health provider who is managing their mental health condition(s).         History of Present Illness       Adult Annual Physical:  Patient presents for annual physical. Here for  permit PE. Denies having had license previously. .     Diet and Physical Activity:  - Diet/Nutrition: poor diet.  - Exercise: no formal exercise.    General Health:  - Sleep: sleeps well.  - Hearing: normal hearing right ear and normal hearing left ear.  -  "Vision: goes for regular eye exams and wears glasses.  - Dental: regular dental visits.    /GYN Health:  - Follows with GYN: yes.   - Menopause: premenopausal.   - Contraception: oral contraceptives.          Review of Systems   Constitutional: Negative.    HENT: Negative.     Eyes: Negative.    Respiratory: Negative.     Cardiovascular: Negative.    Gastrointestinal: Negative.    Genitourinary: Negative.    Musculoskeletal: Negative.    Neurological: Negative.    Psychiatric/Behavioral:  Negative for dysphoric mood. The patient is not nervous/anxious.        Objective     /64   Pulse 81   Temp 97.8 °F (36.6 °C)   Ht 5' 4\" (1.626 m)   Wt 67.3 kg (148 lb 6.4 oz)   BMI 25.47 kg/m²     Physical Exam  Vitals reviewed.   Constitutional:       General: She is not in acute distress.     Appearance: Normal appearance.   HENT:      Head: Normocephalic.      Right Ear: Tympanic membrane normal.      Left Ear: Tympanic membrane normal.      Nose: Nose normal.      Mouth/Throat:      Mouth: Mucous membranes are moist.      Pharynx: Oropharynx is clear.   Eyes:      General: No scleral icterus.  Neck:      Thyroid: No thyromegaly.   Cardiovascular:      Rate and Rhythm: Normal rate and regular rhythm.      Heart sounds: No murmur heard.  Pulmonary:      Effort: Pulmonary effort is normal. No respiratory distress.      Breath sounds: Normal breath sounds.   Abdominal:      General: Bowel sounds are normal.      Palpations: Abdomen is soft.      Tenderness: There is no abdominal tenderness.   Musculoskeletal:         General: Normal range of motion.      Cervical back: Normal range of motion.      Right lower leg: No edema.      Left lower leg: No edema.   Lymphadenopathy:      Cervical: No cervical adenopathy.   Skin:     General: Skin is warm and dry.   Neurological:      General: No focal deficit present.      Mental Status: She is alert and oriented to person, place, and time.   Psychiatric:         Mood and " Affect: Mood normal.         Behavior: Behavior normal.         Thought Content: Thought content normal.         Judgment: Judgment normal.         Administrative Statements   I have spent a total time of 20 minutes on 06/17/24 In caring for this patient including Instructions for management, Patient and family education, Impressions, Counseling / Coordination of care, Documenting in the medical record, Reviewing / ordering tests, medicine, procedures  , and Obtaining or reviewing history  .    Depression Screening Follow-up Plan: Patient's depression screening was positive with a PHQ-2 score of . Their PHQ-9 score was 10. Continue regular follow-up with their psychologist/therapist/psychiatrist who is managing their mental health condition(s).

## 2024-06-17 NOTE — TELEPHONE ENCOUNTER
Left message to call me back.  Need to change her appointment to arrival time 10:05 for a 10:20 appointment.  Please put her through to the office.

## 2024-08-26 ENCOUNTER — TELEMEDICINE (OUTPATIENT)
Dept: PSYCHIATRY | Facility: CLINIC | Age: 24
End: 2024-08-26
Payer: COMMERCIAL

## 2024-08-26 DIAGNOSIS — F41.1 GENERALIZED ANXIETY DISORDER: ICD-10-CM

## 2024-08-26 DIAGNOSIS — F31.9 BIPOLAR AFFECTIVE DISORDER, REMISSION STATUS UNSPECIFIED (HCC): Primary | ICD-10-CM

## 2024-08-26 PROCEDURE — 99214 OFFICE O/P EST MOD 30 MIN: CPT | Performed by: PSYCHIATRY & NEUROLOGY

## 2024-08-26 RX ORDER — BUPROPION HYDROCHLORIDE 100 MG/1
TABLET ORAL
Qty: 90 TABLET | Refills: 0 | Status: SHIPPED | OUTPATIENT
Start: 2024-08-26

## 2024-08-26 RX ORDER — ESCITALOPRAM OXALATE 20 MG/1
TABLET ORAL
Qty: 90 TABLET | Refills: 0 | Status: SHIPPED | OUTPATIENT
Start: 2024-08-26

## 2024-08-26 RX ORDER — LAMOTRIGINE 200 MG/1
200 TABLET ORAL DAILY
Qty: 90 TABLET | Refills: 0 | Status: SHIPPED | OUTPATIENT
Start: 2024-08-26

## 2024-08-26 NOTE — PSYCH
Virtual Regular Visit    Verification of patient location:    Patient is located at Home in the following state in which I hold an active license [place]      Assessment/Plan:    Problem List Items Addressed This Visit    None      Goals addressed in session: Goal 1          Reason for visit is   Chief Complaint   Patient presents with    Medication Management        Encounter provider Tiffanie Castorena MD      Recent Visits  No visits were found meeting these conditions.  Showing recent visits within past 7 days and meeting all other requirements  Today's Visits  Date Type Provider Dept   08/26/24 Telemedicine Tiffanie Castorena MD [facility]   Showing today's visits and meeting all other requirements  Future Appointments  No visits were found meeting these conditions.  Showing future appointments within next 150 days and meeting all other requirements       The patient was identified by name and date of birth. Amy Villatoro was informed that this is a telemedicine visit and that the visit is being conducted throughthe Epic Embedded platform. She agrees to proceed..  My office door was closed. No one else was in the room.  She acknowledged consent and understanding of privacy and security of the video platform. The patient has agreed to participate and understands they can discontinue the visit at any time.    Patient is aware this is a billable service.     Subjective  Amy Villatoro is a 24 y.o. adult with bipolar do and anxiety presents for regular f/u  .  Compliant with meds, denies SE  Denies acute medical problems  Pt `s job got closed last week ; pt got compensation; looking for a job   Pt got a permit; learning how to drive  Pt lives with parents    HPI   Mood - reports as mostly stable; not depressed; denies mood swings; does ADLs  Anxiety - controlled; denies panic attacks or racing thoughts  Past Medical History:   Diagnosis Date    BMI 28.0-28.9,adult 1/17/2020    Depression     Dysuria     LAST ASSESSED  [id number]    Migraine     LAST ASSESSED [id number]    Miscarriage 2019    Ovarian cyst        Past Surgical History:   Procedure Laterality Date    DILATION AND CURETTAGE OF UTERUS  2019       Current Outpatient Medications   Medication Sig Dispense Refill    hydrOXYzine HCL (ATARAX) 10 mg tablet Take 1 tablet (10 mg total) by mouth daily as needed for anxiety 30 tablet 1    buPROPion (WELLBUTRIN) 100 mg tablet TAKE ONE TABLET BY MOUTH DAILY IN THE MORNING 90 tablet 0    escitalopram (LEXAPRO) 20 mg tablet Escitalopram 20m tablet po daily 90 tablet 0    lamoTRIgine (LaMICtal) 200 MG tablet Take 1 tablet (200 mg total) by mouth daily 90 tablet 0    norgestimate-ethinyl estradiol (ORTHO TRI-CYCLEN,TRINESSA) 0.18/0.215/0.25 MG-35 MCG per tablet        No current facility-administered medications for this visit.        No Known Allergies    Review of Systems   Constitutional:  Negative for activity change and appetite change.   Psychiatric/Behavioral:  Negative for dysphoric mood, sleep disturbance and suicidal ideas. The patient is not nervous/anxious.        Video Exam    There were no vitals filed for this visit.    Physical Exam  Constitutional:       Appearance: Normal appearance. She is normal weight.   Neurological:      Mental Status: She is alert.   Psychiatric:         Attention and Perception: Attention and perception normal.         Mood and Affect: Mood normal. Affect is blunt.         Speech: Speech normal.         Behavior: Behavior normal. Behavior is cooperative.         Thought Content: Thought content normal.         Judgment: Judgment normal.          Visit Time    Visit Start Time: 1.57 pm   Visit Stop Time: 2.06 pm   Total Visit Duration:  20 minutes

## 2024-11-14 ENCOUNTER — TELEPHONE (OUTPATIENT)
Dept: PSYCHIATRY | Facility: CLINIC | Age: 24
End: 2024-11-14

## 2024-11-14 NOTE — TELEPHONE ENCOUNTER
Writer called and left detailed voicemail for client to find and complete the virtual care behavioral health consent form in their [employer].

## 2024-11-18 ENCOUNTER — TELEPHONE (OUTPATIENT)
Dept: PSYCHIATRY | Facility: CLINIC | Age: 24
End: 2024-11-18

## 2024-11-18 ENCOUNTER — TELEMEDICINE (OUTPATIENT)
Dept: PSYCHIATRY | Facility: CLINIC | Age: 24
End: 2024-11-18
Payer: COMMERCIAL

## 2024-11-18 DIAGNOSIS — F31.9 BIPOLAR AFFECTIVE DISORDER, REMISSION STATUS UNSPECIFIED (HCC): Primary | ICD-10-CM

## 2024-11-18 DIAGNOSIS — F41.1 GENERALIZED ANXIETY DISORDER: ICD-10-CM

## 2024-11-18 PROCEDURE — 99214 OFFICE O/P EST MOD 30 MIN: CPT | Performed by: PSYCHIATRY & NEUROLOGY

## 2024-11-18 RX ORDER — HYDROXYZINE HYDROCHLORIDE 10 MG/1
10 TABLET, FILM COATED ORAL DAILY PRN
Qty: 90 TABLET | Refills: 0 | Status: SHIPPED | OUTPATIENT
Start: 2024-11-18

## 2024-11-18 RX ORDER — BUPROPION HYDROCHLORIDE 100 MG/1
TABLET ORAL
Qty: 90 TABLET | Refills: 0 | Status: SHIPPED | OUTPATIENT
Start: 2024-11-18

## 2024-11-18 RX ORDER — BUSPIRONE HYDROCHLORIDE 5 MG/1
5 TABLET ORAL DAILY
Qty: 30 TABLET | Refills: 1 | Status: SHIPPED | OUTPATIENT
Start: 2024-11-18

## 2024-11-18 RX ORDER — ESCITALOPRAM OXALATE 20 MG/1
TABLET ORAL
Qty: 90 TABLET | Refills: 0 | Status: SHIPPED | OUTPATIENT
Start: 2024-11-18

## 2024-11-18 RX ORDER — LAMOTRIGINE 200 MG/1
200 TABLET ORAL DAILY
Qty: 90 TABLET | Refills: 0 | Status: SHIPPED | OUTPATIENT
Start: 2024-11-18

## 2024-11-18 NOTE — PSYCH
Virtual Regular Visit    Verification of patient location:    Patient is located at Home in the following state in which I hold an active license [place]      Assessment/Plan:  Assessment & Plan  Bipolar affective disorder, remission status unspecified (HCC)    Orders:    buPROPion (WELLBUTRIN) 100 mg tablet; TAKE ONE TABLET BY MOUTH DAILY IN THE MORNING    lamoTRIgine (LaMICtal) 200 MG tablet; Take 1 tablet (200 mg total) by mouth daily    Generalized anxiety disorder    Orders:    escitalopram (LEXAPRO) 20 mg tablet; Escitalopram 20m tablet po daily    hydrOXYzine HCL (ATARAX) 10 mg tablet; Take 1 tablet (10 mg total) by mouth daily as needed for anxiety    busPIRone (BUSPAR) 5 mg tablet; Take 1 tablet (5 mg total) by mouth in the morning       Problem List Items Addressed This Visit    None  Visit Diagnoses         Bipolar affective disorder, remission status unspecified (HCC)    -  Primary      Generalized anxiety disorder                Goals addressed in session: Goal 1          Reason for visit is   Chief Complaint   Patient presents with    Medication Management        Encounter provider Tiffanie Castorena MD      Recent Visits  Date Type Provider Dept   24 Telephone [facility]   Showing recent visits within past 7 days and meeting all other requirements  Today's Visits  Date Type Provider Dept   [facility]   24 Telemedicine Tiffanie Castorena MD [facility]   Showing today's visits and meeting all other requirements  Future Appointments  No visits were found meeting these conditions.  Showing future appointments within next 150 days and meeting all other requirements       The patient was identified by name and date of birth. Amy Villatoro was informed that this is a telemedicine visit and that the visit is being conducted throughthe Epic Embedded platform. She agrees to proceed..  My office door was closed.  "No one else was in the room.  She acknowledged consent and understanding of privacy and security of the video platform. The patient has agreed to participate and understands they can discontinue the visit at any time.    Patient is aware this is a billable service.     Subjective  Amy Villatoro is a 24 y.o. adult with bipolar do and anxiety presents for regular f/u  .  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 Pt takes atarax 1/2 tab prn - SE sedation  Pt failed propranolol - no effect  Past Medical History:   Diagnosis Date    BMI 28.0-28.9,adult 2020    Depression     Dysuria     LAST ASSESSED [id number]    Migraine     LAST ASSESSED [id number]    Miscarriage 2019    Ovarian cyst        Past Surgical History:   Procedure Laterality Date    DILATION AND CURETTAGE OF UTERUS  2019       Current Outpatient Medications   Medication Sig Dispense Refill    buPROPion (WELLBUTRIN) 100 mg tablet TAKE ONE TABLET BY MOUTH DAILY IN THE MORNING 90 tablet 0    escitalopram (LEXAPRO) 20 mg tablet Escitalopram 20m tablet po daily 90 tablet 0    hydrOXYzine HCL (ATARAX) 10 mg tablet Take 1 tablet (10 mg total) by mouth daily as needed for anxiety 30 tablet 1    lamoTRIgine (LaMICtal) 200 MG tablet Take 1 tablet (200 mg total) by mouth daily 90 tablet 0    norgestimate-ethinyl estradiol (ORTHO TRI-CYCLEN,TRINESSA) 0.18/0.215/0.25 MG-35 MCG per tablet        No current facility-administered medications for this visit.        No Known Allergies    Review of Systems "   Constitutional:  Negative for activity change and appetite change.   Psychiatric/Behavioral:  Negative for dysphoric mood, sleep disturbance and suicidal ideas. The patient is nervous/anxious.        Video Exam    There were no vitals filed for this visit.    Physical Exam  Constitutional:       Appearance: Normal appearance. She is normal weight.   Neurological:      Mental Status: She is alert.   Psychiatric:         Attention and Perception: Attention and perception normal.         Mood and Affect: Mood is anxious. Mood is not depressed. Affect is blunt.         Speech: Speech normal.         Behavior: Behavior normal. Behavior is cooperative.         Thought Content: Thought content normal.         Judgment: Judgment normal.          Visit Time  Face to face  Visit Start Time: 1.30 pm   Visit Stop Time: 1.41 pm   Total Visit Duration:  25 minutes total spent in patient care

## 2024-11-18 NOTE — BH TREATMENT PLAN
TREATMENT PLAN (Medication Management Only)        [facility]    Name and Date of Birth:  Amy Villatoro 24 y.o. 2000  Date of Treatment Plan: November 18, 2024  Diagnosis/Diagnoses:    1. Bipolar affective disorder, remission status unspecified (HCC)    2. Generalized anxiety disorder      Strengths/Personal Resources for Self-Care: supportive friends, taking medications as prescribed.  Area/Areas of need (in own words): anxiety, depression  1. Long Term Goal: improve anxiety.  Target Date:6 months - 5/18/2025  Person/Persons responsible for completion of goal: Amy  2.  Short Term Objective (s) - How will we reach this goal?:   A. Provider new recommended medication/dosage changes and/or continue medication(s): continue current medications as prescribed Lexapro, Wellbutrin XL, Lamictal, Buspar.  B. N/A.  C. N/A.  Target Date:6 months - 5/18/2025  Person/Persons Responsible for Completion of Goal: Amy  Progress Towards Goals: continuing treatment  Treatment Modality: medication management every 4 weeks  Review due 180 days from date of this plan: 6 months - 5/18/2025  Expected length of service: ongoing treatment  My Physician/PA/NP and I have developed this plan together and I agree to work on the goals and objectives. I understand the treatment goals that were developed for my treatment.

## 2024-11-18 NOTE — TELEPHONE ENCOUNTER
Writer called and discussed [facility] care behavioral health consent form with client. Client is aware. Writer will call back if it does not come through by 1:25.

## 2024-11-18 NOTE — TELEPHONE ENCOUNTER
Writer called client back as he noticed E-Check in was completed. VC form does not prompt on E-Check in. Writer walked client where to pull up the form manually in the Document's Center ->My Documents tab. Client found the form and is filling it out.    Writer will call back after the appointment to schedule follow up.

## 2024-11-18 NOTE — TELEPHONE ENCOUNTER
Writer called and scheduled 4 week follow up with Dr. Castorena. Client scheduled for 12/16/24 at 3 pm and her forms will be up to date. Client aware forms will likely be needed AFTER this next appointment.

## 2024-11-20 RX ORDER — BUSPIRONE HYDROCHLORIDE 5 MG/1
5 TABLET ORAL EVERY MORNING
Qty: 14 TABLET | Refills: 0 | OUTPATIENT
Start: 2024-11-20

## 2024-11-20 RX ORDER — ESCITALOPRAM OXALATE 20 MG/1
20 TABLET ORAL DAILY
Qty: 14 TABLET | Refills: 0 | OUTPATIENT
Start: 2024-11-20

## 2024-11-20 RX ORDER — BUPROPION HYDROCHLORIDE 100 MG/1
100 TABLET ORAL EVERY MORNING
Qty: 14 TABLET | Refills: 0 | OUTPATIENT
Start: 2024-11-20

## 2024-11-20 RX ORDER — HYDROXYZINE HYDROCHLORIDE 10 MG/1
10 TABLET, FILM COATED ORAL EVERY 6 HOURS PRN
Qty: 14 TABLET | Refills: 0 | OUTPATIENT
Start: 2024-11-20

## 2024-11-20 RX ORDER — LAMOTRIGINE 200 MG/1
200 TABLET ORAL DAILY
Qty: 14 TABLET | Refills: 0 | OUTPATIENT
Start: 2024-11-20

## 2024-11-20 NOTE — TELEPHONE ENCOUNTER
Reason for call:   [x] Refill-patient starting with mail order pharmacy but its going to take them 2 weeks to get her meds to her, requesting small supply be sent to [place] pharmacy to hold her over  [] Prior Auth  [] Other:     Office:   [] PCP/Provider -   [x] Specialty/Provider - [facility]/ Tiffanie Castorena    buPROPion (WELLBUTRIN) 100 mg tablet TAKE ONE TABLET BY MOUTH DAILY IN THE MORNING     busPIRone (BUSPAR) 5 mg tablet  Take 1 tablet (5 mg total) by mouth in the morning     escitalopram (LEXAPRO) 20 mg tablet 1 tablet po daily     hydrOXYzine HCL (ATARAX) 10 mg tablet Take 1 tablet (10 mg total) by mouth daily as needed for anxiety     lamoTRIgine (LaMICtal) 200 MG tablet Take 1 tablet (200 mg total) by mouth daily     Pharmacy: [place] [facility]    Does the patient have enough for 3 days?   [] Yes   [] No - Send as HP to POD

## 2024-11-26 DIAGNOSIS — F41.1 GENERALIZED ANXIETY DISORDER: Primary | ICD-10-CM

## 2024-11-26 RX ORDER — GABAPENTIN 100 MG/1
100 CAPSULE ORAL DAILY
Qty: 30 CAPSULE | Refills: 1 | Status: SHIPPED | OUTPATIENT
Start: 2024-11-26

## 2024-12-09 ENCOUNTER — OFFICE VISIT (OUTPATIENT)
Dept: OBGYN CLINIC | Facility: CLINIC | Age: 24
End: 2024-12-09
Payer: COMMERCIAL

## 2024-12-09 VITALS
SYSTOLIC BLOOD PRESSURE: 102 MMHG | HEIGHT: 64 IN | DIASTOLIC BLOOD PRESSURE: 64 MMHG | WEIGHT: 163 LBS | BODY MASS INDEX: 27.83 KG/M2

## 2024-12-09 DIAGNOSIS — Z01.419 WELL WOMAN EXAM: Primary | ICD-10-CM

## 2024-12-09 PROCEDURE — G0145 SCR C/V CYTO,THINLAYER,RESCR: HCPCS | Performed by: PATHOLOGY

## 2024-12-09 PROCEDURE — 87624 HPV HI-RISK TYP POOLED RSLT: CPT | Performed by: OBSTETRICS & GYNECOLOGY

## 2024-12-09 PROCEDURE — S0610 ANNUAL GYNECOLOGICAL EXAMINA: HCPCS | Performed by: OBSTETRICS & GYNECOLOGY

## 2024-12-09 RX ORDER — NORGESTIMATE AND ETHINYL ESTRADIOL 7DAYSX3 28
1 KIT ORAL DAILY
Qty: 84 TABLET | Refills: 3 | Status: SHIPPED | OUTPATIENT
Start: 2024-12-09

## 2024-12-09 NOTE — PROGRESS NOTES
[facility] - [address], [address], [place] [address]    ASSESSMENT/PLAN: Amy Villatoro is a 24 y.o.  who presents for annual gynecologic exam.    Encounter for routine gynecologic examination  - Routine well woman exam completed today.  - Cervical Cancer Screening: Current ASCCP Guidelines reviewed. Last Pap: Not on file . History of abnormal: denies  - HPV Vaccination status: Not immunized  - STI screening offered including HIV testing: offered, pt declined  - Contraceptive counseling discussed.  Current contraception: oral contraceptives (estrogen/progesterone), :   - The following were reviewed in today's visit: breast self exam, adequate intake of calcium and vitamin D, exercise, and healthy diet  - Discussed can change type of birth control, however reports very occasionally forgets to take pill and desires to cont with OCPS     Additional problems addressed during this visit:  1. Well woman exam  -     norgestimate-ethinyl estradiol (ORTHO TRI-CYCLEN,TRINESSA) 0.18/0.215/0.25 MG-35 MCG per tablet; Take 1 tablet by mouth daily  -     Liquid-based pap, screening      CC:  Annual Gynecologic Examination    HPI: Amy Villatoro is a 24 y.o.  who presents for annual gynecologic examination.She reports sometimes she forgets taking her pill on time and has pelvic pain that resumes. She is also using condoms as back up contraception    ROS: Negative except as noted in HPI    Patient's last menstrual period was 2024.       She  reports being sexually active and has had partner(s) who are female. She reports using the following method of birth control/protection: OCP.       The following portions of the patient's history were reviewed and updated as appropriate:   Past Medical History:   Diagnosis Date    Anxiety     BMI 28.0-28.9,adult 2020    Depression     Dysuria     LAST ASSESSED [id number]    Eating disorder     Migraine     LAST ASSESSED [id number]    Miscarriage 2019  "   Ovarian cyst      Past Surgical History:   Procedure Laterality Date    DILATION AND CURETTAGE OF UTERUS  06/2019     Family History   Problem Relation Age of Onset    Irritable bowel syndrome Mother     Iron deficiency Mother         IRON DEFICIENCY ANEMIA LOWEST 5.8     Diabetes Maternal Grandfather     Heart disease Maternal Grandfather     Depression Maternal Grandmother      Social History     Socioeconomic History    Marital status: /Civil Union     Spouse name: None    Number of children: None    Years of education: 12    Highest education level: None   Occupational History    None   Tobacco Use    Smoking status: Former     Types: Cigarettes     Start date: 2019    Smokeless tobacco: Never   Vaping Use    Vaping status: Every Day    Substances: Nicotine, Flavoring   Substance and Sexual Activity    Alcohol use: No    Drug use: No    Sexual activity: Yes     Partners: Female     Birth control/protection: OCP   Other Topics Concern    None   Social History Narrative    None     Social Drivers of Health     Financial Resource Strain: Not on file   Food Insecurity: Not on file   Transportation Needs: Not on file   Physical Activity: Not on file   Stress: Not on file   Social Connections: Not on file   Intimate Partner Violence: Not on file   Housing Stability: Not on file     Outpatient Medications Marked as Taking for the 12/9/24 encounter (Office Visit) with Roby LUDWIG DO   Medication    buPROPion (WELLBUTRIN) 100 mg tablet    escitalopram (LEXAPRO) 20 mg tablet    hydrOXYzine HCL (ATARAX) 10 mg tablet    lamoTRIgine (LaMICtal) 200 MG tablet    norgestimate-ethinyl estradiol (ORTHO TRI-CYCLEN,TRINESSA) 0.18/0.215/0.25 MG-35 MCG per tablet    [DISCONTINUED] norgestimate-ethinyl estradiol (ORTHO TRI-CYCLEN,TRINESSA) 0.18/0.215/0.25 MG-35 MCG per tablet     No Known Allergies        Objective:  /64 (BP Location: Right arm, Patient Position: Sitting, Cuff Size: Standard)   Ht 5' 4\" (1.626 " m)   Wt 73.9 kg (163 lb)   LMP 12/04/2024   BMI 27.98 kg/m²        Chaperone present? Pt declined    General Appearance: alert and oriented, in no acute distress.   Respiratory: Unlabored breathing.  Abdomen: Soft, non-tender, non-distended, no masses, no rebound or guarding.  Breast Exam: No dimpling, nipple retraction or discharge. No lumps or masses. No axillary or supraclavicular nodes.   Pelvic:   External genitalia: Normal appearance, no abnormal pigmentation, no lesions or masses. Normal Bartholin's and Dell's.      Urinary system: Urethral meatus normal,       Bladder non-tender.      Vaginal: normal mucosa without prolapse or lesions. Normal-appearing physiologic discharge.      Cervix: Normal-appearing, well-epithelialized, no gross lesions or masses. No cervical motion tenderness.      Adnexa: No adnexal masses or tenderness noted.      Uterus: Normal-sized, regular contour, midline, mobile, no uterine tenderness.  Extremities: Normal range of motion. Warm, well-perfused, non-tender.   Skin: normal, no rash or abnormalities  Neurologic: alert, oriented x3  Psychiatric: Appropriate affect, mood stable, cooperative with exam.        Roby Lenz,   12/9/2024 2:15 PM

## 2024-12-12 ENCOUNTER — RESULTS FOLLOW-UP (OUTPATIENT)
Dept: OBGYN CLINIC | Facility: CLINIC | Age: 24
End: 2024-12-12

## 2024-12-12 LAB
LAB AP GYN PRIMARY INTERPRETATION: ABNORMAL
LAB AP LMP: ABNORMAL
Lab: ABNORMAL
PATH INTERP SPEC-IMP: ABNORMAL

## 2024-12-12 PROCEDURE — G0124 SCREEN C/V THIN LAYER BY MD: HCPCS | Performed by: PATHOLOGY

## 2024-12-13 LAB
HPV HR 12 DNA CVX QL NAA+PROBE: NEGATIVE
HPV16 DNA CVX QL NAA+PROBE: NEGATIVE
HPV18 DNA CVX QL NAA+PROBE: NEGATIVE

## 2024-12-16 ENCOUNTER — TELEPHONE (OUTPATIENT)
Dept: PSYCHIATRY | Facility: CLINIC | Age: 24
End: 2024-12-16

## 2024-12-16 ENCOUNTER — TELEMEDICINE (OUTPATIENT)
Dept: PSYCHIATRY | Facility: CLINIC | Age: 24
End: 2024-12-16
Payer: COMMERCIAL

## 2024-12-16 DIAGNOSIS — F31.9 BIPOLAR AFFECTIVE DISORDER, REMISSION STATUS UNSPECIFIED (HCC): Primary | ICD-10-CM

## 2024-12-16 DIAGNOSIS — F41.1 GENERALIZED ANXIETY DISORDER: ICD-10-CM

## 2024-12-16 PROCEDURE — 99214 OFFICE O/P EST MOD 30 MIN: CPT | Performed by: PSYCHIATRY & NEUROLOGY

## 2024-12-16 RX ORDER — LAMOTRIGINE 200 MG/1
200 TABLET ORAL DAILY
Qty: 90 TABLET | Refills: 0 | Status: SHIPPED | OUTPATIENT
Start: 2024-12-16

## 2024-12-16 RX ORDER — HYDROXYZINE HYDROCHLORIDE 10 MG/1
10 TABLET, FILM COATED ORAL DAILY PRN
Qty: 90 TABLET | Refills: 0 | Status: SHIPPED | OUTPATIENT
Start: 2024-12-16

## 2024-12-16 RX ORDER — ESCITALOPRAM OXALATE 20 MG/1
TABLET ORAL
Qty: 90 TABLET | Refills: 0 | Status: SHIPPED | OUTPATIENT
Start: 2024-12-16

## 2024-12-16 RX ORDER — BUPROPION HYDROCHLORIDE 100 MG/1
TABLET ORAL
Qty: 90 TABLET | Refills: 0 | Status: SHIPPED | OUTPATIENT
Start: 2024-12-16

## 2024-12-16 NOTE — PSYCH
Virtual Regular Visit    Verification of patient location:    Patient is located at Home in the following state in which I hold an active license [place]      Assessment/Plan:  Assessment & Plan  Bipolar affective disorder, remission status unspecified (HCC)    Orders:    buPROPion (WELLBUTRIN) 100 mg tablet; TAKE ONE TABLET BY MOUTH DAILY IN THE MORNING    lamoTRIgine (LaMICtal) 200 MG tablet; Take 1 tablet (200 mg total) by mouth daily    Generalized anxiety disorder    Orders:    escitalopram (LEXAPRO) 20 mg tablet; Escitalopram 20m tablet po daily    hydrOXYzine HCL (ATARAX) 10 mg tablet; Take 1 tablet (10 mg total) by mouth daily as needed for anxiety       Problem List Items Addressed This Visit    None  Visit Diagnoses         Bipolar affective disorder, remission status unspecified (HCC)    -  Primary      Generalized anxiety disorder                Goals addressed in session: Goal 1     Depression Follow-up Plan Completed: Not applicable    Reason for visit is   Chief Complaint   Patient presents with    Medication Management        Encounter provider Tiffanie Castorena MD      Recent Visits  No visits were found meeting these conditions.  Showing recent visits within past 7 days and meeting all other requirements  Today's Visits  Date Type Provider Dept   24 Telemedicine Tiffanie Castorena MD [facility]   Showing today's visits and meeting all other requirements  Future Appointments  No visits were found meeting these conditions.  Showing future appointments within next 150 days and meeting all other requirements       The patient was identified by name and date of birth. Amy Villatoro was informed that this is a telemedicine visit and that the visit is being conducted throughthe Epic Embedded platform. She agrees to proceed..  My office door was closed. No one else was in the room.  She acknowledged consent and understanding of privacy and security of the video platform. The patient has agreed to  "participate and understands they can discontinue the visit at any time.    Patient is aware this is a billable service.     Subjective  Amy Villatoro is a 24 y.o. adult with bipolar do and anxiety presents for regular f/u  .  She stopped neurontin - SE \" feeling weird\"; failed propranolol/buspar; currently takes atarax 10 mg qd  prn with good effect  Pt continued other meds  I reviewed the chart  No recent check up or blood work      HPI   Mood - reports as  mostly stable; denies depression or mood swings; does ADLs, stays social  1 x week gets tired and sleeps more  Anxiety - continues to worry about work; mostly in the morning. She uses coping skills and atarax prn  Past Medical History:   Diagnosis Date    Anxiety     BMI 28.0-28.9,adult 2020    Depression     Dysuria     LAST ASSESSED [id number]    Eating disorder     Migraine     LAST ASSESSED [id number]    Miscarriage 2019    Ovarian cyst        Past Surgical History:   Procedure Laterality Date    DILATION AND CURETTAGE OF UTERUS  2019       Current Outpatient Medications   Medication Sig Dispense Refill    gabapentin (Neurontin) 100 mg capsule Take 1 capsule (100 mg total) by mouth daily 30 capsule 1    buPROPion (WELLBUTRIN) 100 mg tablet TAKE ONE TABLET BY MOUTH DAILY IN THE MORNING 90 tablet 0    escitalopram (LEXAPRO) 20 mg tablet Escitalopram 20m tablet po daily 90 tablet 0    hydrOXYzine HCL (ATARAX) 10 mg tablet Take 1 tablet (10 mg total) by mouth daily as needed for anxiety 90 tablet 0    lamoTRIgine (LaMICtal) 200 MG tablet Take 1 tablet (200 mg total) by mouth daily 90 tablet 0    norgestimate-ethinyl estradiol (ORTHO TRI-CYCLEN,TRINESSA) 0.18/0.215/0.25 MG-35 MCG per tablet Take 1 tablet by mouth daily 84 tablet 3     No current facility-administered medications for this visit.        No Known Allergies    Review of Systems   Constitutional:  Negative for activity change and appetite change.   Psychiatric/Behavioral:  Negative for " dysphoric mood, sleep disturbance and suicidal ideas. The patient is not nervous/anxious.        Video Exam    There were no vitals filed for this visit.    Physical Exam  Constitutional:       Appearance: Normal appearance. She is normal weight.   Neurological:      Mental Status: She is alert.   Psychiatric:         Attention and Perception: Attention and perception normal.         Mood and Affect: Mood normal. Affect is blunt.         Speech: Speech normal.         Behavior: Behavior normal. Behavior is cooperative.         Thought Content: Thought content normal.         Judgment: Judgment normal.          Visit Time  Face to face  Visit Start Time: 3.00 pm   Visit Stop Time: 3.12 pm   Total Visit Duration:  23 minutes total spent in patient care

## 2024-12-16 NOTE — TELEPHONE ENCOUNTER
Writer called and left voicemail scheduling client for 3/17/25 at 1:30 based on previous availability Monday afternoons and asked client to call back if time/day does not work.    Writer also let client know [facility] and will be due by her next appointment.

## 2025-01-14 ENCOUNTER — TELEPHONE (OUTPATIENT)
Dept: PSYCHIATRY | Facility: CLINIC | Age: 25
End: 2025-01-14

## 2025-01-14 NOTE — TELEPHONE ENCOUNTER
Writer called and left voicemail for client to complete their required forms in their MyChart's Document Center.

## 2025-01-24 ENCOUNTER — TELEPHONE (OUTPATIENT)
Dept: PSYCHIATRY | Facility: CLINIC | Age: 25
End: 2025-01-24

## 2025-01-24 NOTE — TELEPHONE ENCOUNTER
Writer left another voicemail reminding client of her yearly forms due for her appointment Tuesday with Dr. Castorena.

## 2025-01-28 ENCOUNTER — TELEMEDICINE (OUTPATIENT)
Dept: PSYCHIATRY | Facility: CLINIC | Age: 25
End: 2025-01-28
Payer: COMMERCIAL

## 2025-01-28 ENCOUNTER — TELEPHONE (OUTPATIENT)
Dept: PSYCHIATRY | Facility: CLINIC | Age: 25
End: 2025-01-28

## 2025-01-28 DIAGNOSIS — F41.1 GENERALIZED ANXIETY DISORDER: ICD-10-CM

## 2025-01-28 DIAGNOSIS — F31.9 BIPOLAR AFFECTIVE DISORDER, REMISSION STATUS UNSPECIFIED (HCC): Primary | ICD-10-CM

## 2025-01-28 PROCEDURE — 99214 OFFICE O/P EST MOD 30 MIN: CPT | Performed by: PSYCHIATRY & NEUROLOGY

## 2025-01-28 RX ORDER — ESCITALOPRAM OXALATE 20 MG/1
TABLET ORAL
Qty: 90 TABLET | Refills: 0 | Status: SHIPPED | OUTPATIENT
Start: 2025-01-28

## 2025-01-28 RX ORDER — LAMOTRIGINE 200 MG/1
200 TABLET ORAL DAILY
Qty: 90 TABLET | Refills: 0 | Status: SHIPPED | OUTPATIENT
Start: 2025-01-28

## 2025-01-28 RX ORDER — HYDROXYZINE HYDROCHLORIDE 10 MG/1
10 TABLET, FILM COATED ORAL DAILY PRN
Qty: 90 TABLET | Refills: 0 | Status: SHIPPED | OUTPATIENT
Start: 2025-01-28

## 2025-01-28 RX ORDER — BUPROPION HYDROCHLORIDE 100 MG/1
TABLET ORAL
Qty: 90 TABLET | Refills: 0 | Status: SHIPPED | OUTPATIENT
Start: 2025-01-28

## 2025-01-28 NOTE — TELEPHONE ENCOUNTER
Writer left voicemail scheduling client for 4/28/25 at 2 pm based on previous availability Monday afternoons. Writer asked client to call back if time/date does not work.    Writer also reminded client of [facility] NP forms in her MyChart to sign.

## 2025-01-28 NOTE — PSYCH
Virtual Regular Visit    Verification of patient location:    Patient is located at Home in the following state in which I hold an active license [place]      Assessment/Plan:  Assessment & Plan  Bipolar affective disorder, remission status unspecified (HCC)    Orders:    lamoTRIgine (LaMICtal) 200 MG tablet; Take 1 tablet (200 mg total) by mouth daily    buPROPion (WELLBUTRIN) 100 mg tablet; TAKE ONE TABLET BY MOUTH DAILY IN THE MORNING    Generalized anxiety disorder    Orders:    hydrOXYzine HCL (ATARAX) 10 mg tablet; Take 1 tablet (10 mg total) by mouth daily as needed for anxiety    escitalopram (LEXAPRO) 20 mg tablet; Escitalopram 20m tablet po daily       Problem List Items Addressed This Visit    None  Visit Diagnoses         Bipolar affective disorder, remission status unspecified (HCC)    -  Primary      Generalized anxiety disorder                Goals addressed in session: Goal 1     Depression Follow-up Plan Completed: Not applicable    Reason for visit is   Chief Complaint   Patient presents with    Medication Management        Encounter provider Tiffanie Castorena MD      Recent Visits  Date Type Provider Dept   [facility]   Showing recent visits within past 7 days and meeting all other requirements  Today's Visits  Date Type Provider Dept   25 Telemedicine [facility]   Showing today's visits and meeting all other requirements  Future Appointments  No visits were found meeting these conditions.  Showing future appointments within next 150 days and meeting all other requirements       The patient was identified by name and date of birth. Amy Villatoro was informed that this is a telemedicine visit and that the visit is being conducted throughthe Epic Embedded platform. She agrees to proceed..  My office door was closed. No one else was in the room.  She acknowledged consent and understanding of privacy and security of  the video platform. The patient has agreed to participate and understands they can discontinue the visit at any time.    Patient is aware this is a billable service.     Subjective  Amy Villatoro is a 24 y.o. female with bipolar do and anxiety presents for an earlier appt b/o running out of meds .  Compliant with meds, denies SE  I reviewed the chart  Pt denies acute medical problems  Family/ work /holidays stress    HPI   Mood - reports as mostly stable; denies depression or mood swings. Sometimes gets tired, but mostly functions close to baseline; active and social.  Anxiety - sometimes; at work, in the morning - gets anxious - takes atarax prn with good effect.  Denies panic attacks  Past Medical History:   Diagnosis Date    Anxiety     BMI 28.0-28.9,adult 2020    Depression     Dysuria     LAST ASSESSED [id number]    Eating disorder     Migraine     LAST ASSESSED [id number]    Miscarriage 2019    Ovarian cyst        Past Surgical History:   Procedure Laterality Date    DILATION AND CURETTAGE OF UTERUS  2019       Current Outpatient Medications   Medication Sig Dispense Refill    buPROPion (WELLBUTRIN) 100 mg tablet TAKE ONE TABLET BY MOUTH DAILY IN THE MORNING 90 tablet 0    escitalopram (LEXAPRO) 20 mg tablet Escitalopram 20m tablet po daily 90 tablet 0    hydrOXYzine HCL (ATARAX) 10 mg tablet Take 1 tablet (10 mg total) by mouth daily as needed for anxiety 90 tablet 0    lamoTRIgine (LaMICtal) 200 MG tablet Take 1 tablet (200 mg total) by mouth daily 90 tablet 0    norgestimate-ethinyl estradiol (ORTHO TRI-CYCLEN,TRINESSA) 0.18/0.215/0.25 MG-35 MCG per tablet Take 1 tablet by mouth daily 84 tablet 3     No current facility-administered medications for this visit.        No Known Allergies    Review of Systems   Constitutional:  Negative for activity change and appetite change.   Psychiatric/Behavioral:  Negative for dysphoric mood, sleep disturbance and suicidal ideas. The patient is not  nervous/anxious.        Video Exam    There were no vitals filed for this visit.    Physical Exam  Constitutional:       Appearance: Normal appearance. She is normal weight.   Neurological:      Mental Status: She is alert.   Psychiatric:         Attention and Perception: Attention and perception normal.         Mood and Affect: Mood and affect normal.         Speech: Speech normal.         Behavior: Behavior normal. Behavior is cooperative.         Thought Content: Thought content normal.         Judgment: Judgment normal.          Visit Time  Face to face  Visit Start Time: 8.00 am   Visit Stop Time: 8.08 am   Total Visit Duration:  20 minutes total spent in patient care

## 2025-02-19 ENCOUNTER — APPOINTMENT (OUTPATIENT)
Dept: URGENT CARE | Facility: CLINIC | Age: 25
End: 2025-02-19

## 2025-04-02 DIAGNOSIS — Z01.419 WELL WOMAN EXAM: ICD-10-CM

## 2025-04-03 RX ORDER — NORGESTIMATE AND ETHINYL ESTRADIOL 7DAYSX3 28
1 KIT ORAL DAILY
Qty: 84 TABLET | Refills: 1 | Status: SHIPPED | OUTPATIENT
Start: 2025-04-03

## 2025-04-11 ENCOUNTER — TELEPHONE (OUTPATIENT)
Dept: PSYCHIATRY | Facility: CLINIC | Age: 25
End: 2025-04-11

## 2025-04-11 NOTE — TELEPHONE ENCOUNTER
LVM regarding client wanting to start therapy services again.  Clt is a family member of an employee.  Reached out to get her schedule.

## 2025-04-14 ENCOUNTER — TELEPHONE (OUTPATIENT)
Dept: PSYCHIATRY | Facility: CLINIC | Age: 25
End: 2025-04-14

## 2025-04-25 ENCOUNTER — TELEPHONE (OUTPATIENT)
Dept: PSYCHIATRY | Facility: CLINIC | Age: 25
End: 2025-04-25

## 2025-04-25 NOTE — TELEPHONE ENCOUNTER
Writer left voicemail for client to complete the [facility] New Patient Forms in her [facility]'s document center for her upcoming appointment with Dr. Castorena.    Phone disconnected; however, writer had communicated the message.

## 2025-04-28 ENCOUNTER — TELEMEDICINE (OUTPATIENT)
Dept: PSYCHIATRY | Facility: CLINIC | Age: 25
End: 2025-04-28
Payer: COMMERCIAL

## 2025-04-28 ENCOUNTER — TELEPHONE (OUTPATIENT)
Dept: PSYCHIATRY | Facility: CLINIC | Age: 25
End: 2025-04-28

## 2025-04-28 DIAGNOSIS — F31.9 BIPOLAR AFFECTIVE DISORDER, REMISSION STATUS UNSPECIFIED (HCC): ICD-10-CM

## 2025-04-28 DIAGNOSIS — F41.1 GENERALIZED ANXIETY DISORDER: ICD-10-CM

## 2025-04-28 PROCEDURE — 99214 OFFICE O/P EST MOD 30 MIN: CPT | Performed by: PSYCHIATRY & NEUROLOGY

## 2025-04-28 RX ORDER — ESCITALOPRAM OXALATE 20 MG/1
TABLET ORAL
Qty: 90 TABLET | Refills: 0 | Status: SHIPPED | OUTPATIENT
Start: 2025-04-28

## 2025-04-28 RX ORDER — HYDROXYZINE HYDROCHLORIDE 10 MG/1
10 TABLET, FILM COATED ORAL DAILY PRN
Qty: 90 TABLET | Refills: 0 | Status: SHIPPED | OUTPATIENT
Start: 2025-04-28

## 2025-04-28 RX ORDER — BUPROPION HYDROCHLORIDE 100 MG/1
TABLET ORAL
Qty: 90 TABLET | Refills: 0 | Status: SHIPPED | OUTPATIENT
Start: 2025-04-28

## 2025-04-28 RX ORDER — LAMOTRIGINE 200 MG/1
200 TABLET ORAL DAILY
Qty: 90 TABLET | Refills: 0 | Status: SHIPPED | OUTPATIENT
Start: 2025-04-28

## 2025-04-28 NOTE — PSYCH
MEDICATION MANAGEMENT NOTE    Name: Amy Villatoro      : 2000      MRN: [id number]  Encounter Provider: Tiffanie Castorena MD  Encounter Date: 2025   Encounter department: [facility] OUTPATIENT    Insurance: Payor: [employer] CROSS / Plan: [facility] PLAN 361 / Product Type: Blue Fee for Service /      Reason for Visit:   Chief Complaint   Patient presents with    Medication Management   :  Assessment & Plan  Bipolar affective disorder, remission status unspecified (HCC)         Generalized anxiety disorder             Treatment Recommendations:    Educated about diagnosis and treatment modalities. Verbalizes understanding and agreement with the treatment plan.  Discussed self monitoring of symptoms, and symptom monitoring tools.  Discussed medications and if treatment adjustment was needed or desired.  I am scheduling this patient out for greater than 3 months: No    Medications Risks/Benefits:      Risks, Benefits And Possible Side Effects Of Medications:    Risks, benefits, and possible side effects of medications explained to Amy and she (or legal representative) verbalizes understanding and agreement for treatment.    Controlled Medication Discussion:     Not applicable      History of Present Illness       Pt presents for regular f/u .  Compliant with meds, denies SE  Pt denies acute medical problems.  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 denies panic attacks.  Sleep - irregular hrs  Appetite - normal  Review Of Systems: A review of systems is obtained and is negative except for the pertinent positives listed in HPI/Subjective above.      Current Rating Scores:         Areas of Improvement: reviewed in HPI/Subjective  Section      Past Medical History:   Diagnosis Date    Anxiety     BMI 28.0-28.9,adult 2020    Depression     Dysuria     LAST ASSESSED [id number]    Eating disorder     Migraine     LAST ASSESSED [id number]    Miscarriage 2019    Ovarian cyst      Past Surgical History:   Procedure Laterality Date    DILATION AND CURETTAGE OF UTERUS  2019     Allergies: No Known Allergies    Current Outpatient Medications   Medication Instructions    buPROPion (WELLBUTRIN) 100 mg tablet TAKE ONE TABLET BY MOUTH DAILY IN THE MORNING    escitalopram (LEXAPRO) 20 mg tablet Escitalopram 20m tablet po daily    hydrOXYzine HCL (ATARAX) 10 mg, Oral, Daily PRN    lamoTRIgine (LAMICTAL) 200 mg, Oral, Daily    norgestimate-ethinyl estradiol (ORTHO TRI-CYCLEN,TRINESSA) 0.18/0.215/0.25 MG-35 MCG per tablet 1 tablet, Oral, Daily        Substance Abuse History:    Tobacco, Alcohol and Drug Use History     Tobacco Use    Smoking status: Former     Types: Cigarettes     Start date:     Smokeless tobacco: Never   Vaping Use    Vaping status: Every Day    Substances: Nicotine, Flavoring   Substance Use Topics    Alcohol use: No    Drug use: No          Social History:    Social History     Socioeconomic History    Marital status: /Civil Union     Spouse name: Not on file    Number of children: Not on file    Years of education: 12    Highest education level: Not on file   Occupational History    Not on file   Other Topics Concern    Not on file   Social History Narrative    Not on file        Family Psychiatric History:     Family History   Problem Relation Age of Onset    Irritable bowel syndrome Mother     Iron deficiency Mother         IRON DEFICIENCY ANEMIA LOWEST 5.8     Diabetes Maternal Grandfather     Heart disease Maternal Grandfather     Depression Maternal Grandmother        Medical History Reviewed by provider this encounter:  Tobacco  Allergies  Meds  Problems  Med Hx  Surg Hx  Fam Hx          Objective    There were no vitals taken for this visit.     Mental Status Evaluation:    Appearance age appropriate, casually dressed   Behavior cooperative   Speech normal rate, normal volume   Mood anxious   Affect constricted   Thought Processes organized, goal directed   Thought Content no overt delusions   Perceptual Disturbances: no auditory hallucinations, no visual hallucinations   Abnormal Thoughts  Risk Potential Suicidal ideation - None  Homicidal ideation - None  Potential for aggression - No   Orientation normal   Memory recent and remote memory grossly intact   Consciousness alert and awake   Attention Span Concentration Span attention span and concentration are age appropriate   Intellect appears to be of average intelligence   Insight intact   Judgement intact   Muscle Strength and  Gait unable to assess today due to virtual visit   Motor activity unable to assess today due to virtual visit   Language normal   Fund of Knowledge adequate knowledge of current events       Laboratory Results: I have personally reviewed all pertinent laboratory/tests results        Suicide/Homicide Risk Assessment:    Risk of Harm to Self:  Based on today's assessment, Amy presents the following risk of harm to self: none    Risk of Harm to Others:  Based on today's assessment, Amy presents the following risk of harm to others: none    The following interventions are recommended: Continue medication management.    Psychotherapy Provided:     Individual psychotherapy provided: No    Treatment Plan:    Completed and signed during the session: Yes - Treatment Plan done but not signed at time of office visit due to: Plan reviewed by video and verbal consent given due to virtual visit. Treatment Plan sent to patient via [employer] for signature.    Goals: Progress towards Treatment Plan goals - Yes, progressing slowly, as evidenced by subjective findings in HPI/Subjective Section    Depression Follow-up Plan Completed: Not  applicable    Note Share:        Administrative Statements   Administrative Statements   Encounter provider Tiffanie Castorena MD    The Patient is located at Home and in the following state in which I hold an active license [place].    The patient was identified by name and date of birth. Amy Villatoro was informed that this is a telemedicine visit and that the visit is being conducted through the Epic Embedded platform. She agrees to proceed..  My office door was closed. No one else was in the room.  She acknowledged consent and understanding of privacy and security of the video platform. The patient has agreed to participate and understands they can discontinue the visit at any time.    I have spent a total time of 20 minutes in caring for this patient on the day of the visit/encounter including Risks and benefits of tx options, Instructions for management, Documenting in the medical record, and Reviewing/placing orders in the medical record (including tests, medications, and/or procedures), not including the time spent for establishing the audio/video connection.    Visit Time  Face to face  Visit Start Time: 2.00 pm   Visit Stop Time: 2.09 pm   Total Visit Duration:  20 minutes total spent in patient care        Tiffanie Castorena MD 04/28/25

## 2025-04-28 NOTE — TELEPHONE ENCOUNTER
Writer left voicemail for client to remind her of the [facility] New Patient forms that are now required yearly and left detailed instructions on how to access them.     Writer informed client in voicemail these forms are required for her 2 pm appointment with Dr. Castorena.

## 2025-04-28 NOTE — TELEPHONE ENCOUNTER
Writer called and was able to help client find the [facility] NP forms. Client confirmed they came through on her end and will complete the 5 signatures.

## 2025-04-28 NOTE — BH TREATMENT PLAN
TREATMENT PLAN (Medication Management Only)        [facility]    Name and Date of Birth:  Amy Villatoro 25 y.o. 2000  MRN: [id number]  Date of Treatment Plan: April 28, 2025  Diagnosis/Diagnoses:    1. Bipolar affective disorder, remission status unspecified (HCC)    2. Generalized anxiety disorder      Strengths/Personal Resources for Self-Care: supportive family, taking medications as prescribed.  Area/Areas of need (in own words): anxiety, depression  1. Long Term Goal:   improve anxiety.  Target Date:6 months - 10/28/2025  Person/Persons responsible for completion of goal:  Amy  2.  Short Term Objective (s) - How will we reach this goal?:   A.  Provider new recommended medication/dosage changes and/or continue medication(s): continue current medications as prescribed Lexapro, Wellbutrin XL, Lamictal, and Atarax.  B.  N/A.  C.  N/A.  Target Date:6 months - 10/28/2025  Person/Persons Responsible for Completion of Goal: Amy  Progress Towards Goals: continuing treatment  Treatment Modality: medication management every 3 months  Review due 180 days from date of this plan: 6 months - 10/28/2025  Expected length of service: ongoing treatment unless revised  My Physician/PA/NP and I have developed this plan together and I agree to work on the goals and objectives. I understand the treatment goals that were developed for my treatment.   Electronic Signatures: on file (unless signed below)    Tiffanie Castorena MD 04/28/25

## 2025-06-14 ENCOUNTER — HOSPITAL ENCOUNTER (EMERGENCY)
Facility: HOSPITAL | Age: 25
Discharge: HOME | End: 2025-06-14
Admitting: EMERGENCY MEDICINE
Payer: COMMERCIAL

## 2025-06-14 VITALS
RESPIRATION RATE: 18 BRPM | OXYGEN SATURATION: 97 % | SYSTOLIC BLOOD PRESSURE: 94 MMHG | TEMPERATURE: 97.5 F | BODY MASS INDEX: 28 KG/M2 | WEIGHT: 164 LBS | HEART RATE: 80 BPM | DIASTOLIC BLOOD PRESSURE: 61 MMHG | HEIGHT: 64 IN

## 2025-06-14 DIAGNOSIS — J35.8 TONSIL STONE: Primary | ICD-10-CM

## 2025-06-14 PROCEDURE — 99282 EMERGENCY DEPT VISIT SF MDM: CPT

## 2025-06-14 PROCEDURE — 99284 EMERGENCY DEPT VISIT MOD MDM: CPT | Performed by: EMERGENCY MEDICINE

## 2025-06-14 RX ORDER — LIDOCAINE HYDROCHLORIDE 20 MG/ML
15 SOLUTION OROPHARYNGEAL 4 TIMES DAILY PRN
Qty: 100 ML | Refills: 0 | Status: SHIPPED | OUTPATIENT
Start: 2025-06-14 | End: 2025-06-14

## 2025-06-14 RX ORDER — NAPROXEN 500 MG/1
500 TABLET ORAL ONCE
Status: COMPLETED | OUTPATIENT
Start: 2025-06-14 | End: 2025-06-14

## 2025-06-14 RX ORDER — NAPROXEN 375 MG/1
375 TABLET ORAL 2 TIMES DAILY WITH MEALS
Qty: 20 TABLET | Refills: 0 | Status: SHIPPED | OUTPATIENT
Start: 2025-06-14 | End: 2025-06-14

## 2025-06-14 RX ORDER — NAPROXEN 375 MG/1
375 TABLET ORAL 2 TIMES DAILY WITH MEALS
Qty: 20 TABLET | Refills: 0 | Status: SHIPPED | OUTPATIENT
Start: 2025-06-14

## 2025-06-14 RX ORDER — LIDOCAINE HYDROCHLORIDE 20 MG/ML
15 SOLUTION OROPHARYNGEAL 4 TIMES DAILY PRN
Qty: 100 ML | Refills: 0 | Status: SHIPPED | OUTPATIENT
Start: 2025-06-14

## 2025-06-14 RX ORDER — LIDOCAINE HYDROCHLORIDE 20 MG/ML
15 SOLUTION OROPHARYNGEAL ONCE
Status: COMPLETED | OUTPATIENT
Start: 2025-06-14 | End: 2025-06-14

## 2025-06-14 RX ADMIN — NAPROXEN 500 MG: 500 TABLET ORAL at 15:23

## 2025-06-14 RX ADMIN — LIDOCAINE HYDROCHLORIDE 15 ML: 20 SOLUTION ORAL at 15:23

## 2025-06-14 NOTE — ED PROVIDER NOTES
"Time reflects when diagnosis was documented in both MDM as applicable and the Disposition within this note       Time User Action Codes Description Comment    6/14/2025  3:19 PM Ruddy Hammond [J35.8] Tonsil stone           ED Disposition       ED Disposition   Discharge    Condition   Stable    Date/Time   Sat Jun 14, 2025  3:19 PM    Comment   Amy Villatoro discharge to home/self care.                   Assessment & Plan       Medical Decision Making  25 year old female presenting today with concerns of sore throat, possible tonsillar stone.  No stone seen. Likely too far down the tongue for my visualization. Regardless, will have patient continue warm salt water gurgles, antiinflammatories as needed.  Follow up with ENT.  ------------------------------------------------------------  Strict return precautions discussed. Patient at time of discharge well-appearing in no acute distress, all questions answered. Patient agreeable to plan.  Patient's vitals, lab/imaging results, diagnosis, and treatment plan were discussed with the patient. All new/changed medications were discussed with patient, specifically, route of administration, how often and when to take, and where they can be picked up. Strict return precautions as well as close follow up with PCP was discussed with the patient and the patient was agreeable to my recommendations.  Patient verbally acknowledged understanding of the above communications. All labs reviewed and utilized in the medical decision making process (if labs were ordered). Portions of the record may have been created with voice recognition software.  Occasional wrong word or \"sound a like\" substitutions may have occurred due to the inherent limitations of voice recognition software.  Read the chart carefully and recognize, using context, where substitutions have occurred.      Risk  Prescription drug management.             Medications   Lidocaine Viscous HCl (XYLOCAINE) 2 % mucosal " "solution 15 mL (15 mL Swish & Spit Given 6/14/25 1523)   naproxen (NAPROSYN) tablet 500 mg (500 mg Oral Given 6/14/25 1523)       ED Risk Strat Scores                    No data recorded                            History of Present Illness       Chief Complaint   Patient presents with    Sore Throat     Pt states that that she think she has a tonsil stone stuck on the left side of throat.        Past Medical History[1]   Past Surgical History[2]   Family History[3]   Social History[4]   E-Cigarette/Vaping    E-Cigarette Use Current Every Day User       E-Cigarette/Vaping Substances    Nicotine Yes     THC No     CBD No     Flavoring Yes     Other No     Unknown No       I have reviewed and agree with the history as documented.     25-year-old female presents today with concerns of \"tonsil stone\" in the left side.  States she has had it for several days and is starting to get really annoying.  States that it does feel pretty scratchy.  She denies any other symptoms including any sick contacts.  No fever or chills.        Review of Systems   Constitutional:  Negative for chills and fever.   HENT:  Positive for sore throat. Negative for ear pain.    Eyes:  Negative for pain and visual disturbance.   Respiratory:  Negative for cough and shortness of breath.    Cardiovascular:  Negative for chest pain and palpitations.   Gastrointestinal:  Negative for abdominal pain and vomiting.   Genitourinary:  Negative for dysuria and hematuria.   Musculoskeletal:  Negative for arthralgias and back pain.   Skin:  Negative for color change and rash.   Neurological:  Negative for seizures and syncope.   All other systems reviewed and are negative.          Objective       ED Triage Vitals [06/14/25 1456]   Temperature Pulse Blood Pressure Respirations SpO2 Patient Position - Orthostatic VS   97.5 °F (36.4 °C) 80 94/61 18 97 % --      Temp Source Heart Rate Source BP Location FiO2 (%) Pain Score    Oral Monitor -- -- --      Vitals  "     Date and Time Temp Pulse SpO2 Resp BP Pain Score FACES Pain Rating User   25 1456 97.5 °F (36.4 °C) 80 97 % 18 94/61 -- -- LD            Physical Exam  Vitals and nursing note reviewed.   Constitutional:       General: She is not in acute distress.     Appearance: She is well-developed.   HENT:      Head: Normocephalic and atraumatic.      Mouth/Throat:      Pharynx: Posterior oropharyngeal erythema present.      Tonsils: No tonsillar exudate or tonsillar abscesses. 0 on the right. 0 on the left.      Comments: No tonsil stone seen on exam, even with the ET bili.    Eyes:      Conjunctiva/sclera: Conjunctivae normal.       Cardiovascular:      Rate and Rhythm: Normal rate and regular rhythm.      Heart sounds: No murmur heard.  Pulmonary:      Effort: Pulmonary effort is normal. No respiratory distress.      Breath sounds: Normal breath sounds.   Abdominal:      Palpations: Abdomen is soft.      Tenderness: There is no abdominal tenderness.     Musculoskeletal:         General: No swelling.      Cervical back: Neck supple.     Skin:     General: Skin is warm and dry.      Capillary Refill: Capillary refill takes less than 2 seconds.     Neurological:      Mental Status: She is alert.     Psychiatric:         Mood and Affect: Mood normal.         Results Reviewed       None            No orders to display       Procedures    ED Medication and Procedure Management   Prior to Admission Medications   Prescriptions Last Dose Informant Patient Reported? Taking?   buPROPion (WELLBUTRIN) 100 mg tablet   No No   Sig: TAKE ONE TABLET BY MOUTH DAILY IN THE MORNING   escitalopram (LEXAPRO) 20 mg tablet   No No   Sig: Escitalopram 20m tablet po daily   hydrOXYzine HCL (ATARAX) 10 mg tablet   No No   Sig: Take 1 tablet (10 mg total) by mouth daily as needed for anxiety   lamoTRIgine (LaMICtal) 200 MG tablet   No No   Sig: Take 1 tablet (200 mg total) by mouth daily   norgestimate-ethinyl estradiol (ORTHO  TRI-CYCLEN,TRINESSA) 0.18/0.215/0.25 MG-35 MCG per tablet   No No   Sig: Take 1 tablet by mouth daily      Facility-Administered Medications: None     Discharge Medication List as of 2025  3:21 PM        START taking these medications    Details   Lidocaine Viscous HCl (XYLOCAINE) 2 % mucosal solution Swish and spit 15 mL 4 (four) times a day as needed for mouth pain or discomfort, Starting Sat 2025, Normal      naproxen (NAPROSYN) 375 mg tablet Take 1 tablet (375 mg total) by mouth 2 (two) times a day with meals, Starting Sat 2025, Normal           CONTINUE these medications which have NOT CHANGED    Details   buPROPion (WELLBUTRIN) 100 mg tablet TAKE ONE TABLET BY MOUTH DAILY IN THE MORNING, Normal      escitalopram (LEXAPRO) 20 mg tablet Escitalopram 20m tablet po daily, Normal      hydrOXYzine HCL (ATARAX) 10 mg tablet Take 1 tablet (10 mg total) by mouth daily as needed for anxiety, Starting Mon 2025, Normal      lamoTRIgine (LaMICtal) 200 MG tablet Take 1 tablet (200 mg total) by mouth daily, Starting Mon 2025, Normal      norgestimate-ethinyl estradiol (ORTHO TRI-CYCLEN,TRINESSA) 0.18/0.215/0.25 MG-35 MCG per tablet Take 1 tablet by mouth daily, Starting Thu 4/3/2025, Normal             ED SEPSIS DOCUMENTATION   Time reflects when diagnosis was documented in both MDM as applicable and the Disposition within this note       Time User Action Codes Description Comment    2025  3:19 PM Ruddy Hammond [J35.8] Tonsil stone                    [1]   Past Medical History:  Diagnosis Date    Anxiety     BMI 28.0-28.9,adult 2020    Depression     Dysuria     LAST ASSESSED [id number]    Eating disorder     Migraine     LAST ASSESSED [id number]    Miscarriage 2019    Ovarian cyst    [2]   Past Surgical History:  Procedure Laterality Date    DILATION AND CURETTAGE OF UTERUS  2019   [3]   Family History  Problem Relation Name Age of Onset    Irritable bowel syndrome Mother       Iron deficiency Mother          IRON DEFICIENCY ANEMIA LOWEST 5.8     Diabetes Maternal Grandfather Na     Heart disease Maternal Grandfather Na     Depression Maternal Grandmother Na    [4]   Social History  Tobacco Use    Smoking status: Former     Types: Cigarettes     Start date: 2019    Smokeless tobacco: Never   Vaping Use    Vaping status: Every Day    Substances: Nicotine, Flavoring   Substance Use Topics    Alcohol use: No    Drug use: No        Ruddy Hammond PA-C  06/14/25 1950

## 2025-06-19 ENCOUNTER — TELEPHONE (OUTPATIENT)
Dept: FAMILY MEDICINE CLINIC | Facility: HOSPITAL | Age: 25
End: 2025-06-19

## 2025-07-21 ENCOUNTER — TELEMEDICINE (OUTPATIENT)
Dept: PSYCHIATRY | Facility: CLINIC | Age: 25
End: 2025-07-21
Payer: COMMERCIAL

## 2025-07-21 ENCOUNTER — TELEPHONE (OUTPATIENT)
Dept: PSYCHIATRY | Facility: CLINIC | Age: 25
End: 2025-07-21

## 2025-07-21 DIAGNOSIS — F31.9 BIPOLAR AFFECTIVE DISORDER, REMISSION STATUS UNSPECIFIED (HCC): ICD-10-CM

## 2025-07-21 DIAGNOSIS — F41.1 GENERALIZED ANXIETY DISORDER: ICD-10-CM

## 2025-07-21 PROCEDURE — 99214 OFFICE O/P EST MOD 30 MIN: CPT | Performed by: PSYCHIATRY & NEUROLOGY

## 2025-07-21 RX ORDER — ESCITALOPRAM OXALATE 20 MG/1
TABLET ORAL
Qty: 90 TABLET | Refills: 0 | Status: SHIPPED | OUTPATIENT
Start: 2025-07-21

## 2025-07-21 RX ORDER — HYDROXYZINE HYDROCHLORIDE 10 MG/1
10 TABLET, FILM COATED ORAL DAILY PRN
Qty: 90 TABLET | Refills: 0 | Status: SHIPPED | OUTPATIENT
Start: 2025-07-21

## 2025-07-21 RX ORDER — BUPROPION HYDROCHLORIDE 100 MG/1
TABLET ORAL
Qty: 90 TABLET | Refills: 0 | Status: SHIPPED | OUTPATIENT
Start: 2025-07-21

## 2025-07-21 RX ORDER — LAMOTRIGINE 200 MG/1
200 TABLET ORAL DAILY
Qty: 90 TABLET | Refills: 0 | Status: SHIPPED | OUTPATIENT
Start: 2025-07-21

## 2025-07-21 NOTE — PSYCH
MEDICATION MANAGEMENT NOTE    Name: Amy Villatoro      : 2000      MRN: [id number]  Encounter Provider: Tiffanie Castorena MD  Encounter Date: 2025   Encounter department: [facility] OUTPATIENT    Insurance: Payor: BLUE CROSS / Plan: [facility] [place] PLAN 361 / Product Type: Blue Fee for Service /      Reason for Visit:   Chief Complaint   Patient presents with    Medication Management   :  Assessment & Plan  Bipolar affective disorder, remission status unspecified (HCC)    Orders:    lamoTRIgine (LaMICtal) 200 MG tablet; Take 1 tablet (200 mg total) by mouth daily    buPROPion (WELLBUTRIN) 100 mg tablet; TAKE ONE TABLET BY MOUTH DAILY IN THE MORNING    Generalized anxiety disorder    Orders:    hydrOXYzine HCL (ATARAX) 10 mg tablet; Take 1 tablet (10 mg total) by mouth daily as needed for anxiety    escitalopram (LEXAPRO) 20 mg tablet; Escitalopram 20m tablet po daily        Treatment Recommendations:    Educated about diagnosis and treatment modalities. Verbalizes understanding and agreement with the treatment plan.  Discussed self monitoring of symptoms, and symptom monitoring tools.  Discussed medications and if treatment adjustment was needed or desired.  Aware of 24 hour and weekend coverage for urgent situations accessed by calling [facility] main practice number  I am scheduling this patient out for greater than 3 months: No    Medications Risks/Benefits:      Risks, Benefits And Possible Side Effects Of Medications:    Risks, benefits, and possible side effects of medications explained to Amy and she (or legal representative) verbalizes understanding and agreement for treatment.    Controlled Medication Discussion:     Not applicable      History of Present Illness       Pt presents for regular f/u.  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      Current Rating Scores:         Areas of Improvement: reviewed in HPI/Subjective Section      Past Medical History[1]  Past Surgical History[2]  Allergies: Allergies[3]    Current Outpatient Medications   Medication Instructions    buPROPion (WELLBUTRIN) 100 mg tablet TAKE ONE TABLET BY MOUTH DAILY IN THE MORNING    escitalopram (LEXAPRO) 20 mg tablet Escitalopram 20m tablet po daily    hydrOXYzine HCL (ATARAX) 10 mg, Oral, Daily PRN    lamoTRIgine (LAMICTAL) 200 mg, Oral, Daily    Lidocaine Viscous HCl (XYLOCAINE) 2 % mucosal solution 15 mL, Swish & Spit, 4 times daily PRN    naproxen (NAPROSYN) 375 mg, Oral, 2 times daily with meals    norgestimate-ethinyl estradiol (ORTHO TRI-CYCLEN,TRINESSA) 0.18/0.215/0.25 MG-35 MCG per tablet 1 tablet, Oral, Daily        Substance Abuse History:    Tobacco, Alcohol and Drug Use History     Tobacco Use    Smoking status: Former     Types: Cigarettes     Start date:     Smokeless tobacco: Never   Vaping Use    Vaping status: Every Day    Substances: Nicotine, Flavoring   Substance Use Topics    Alcohol use: No    Drug use: No          Social History:    Social History     Socioeconomic History    Marital status: /Civil Union     Spouse name: Not on file    Number of children: Not on file    Years of education: 12    Highest education level: Not on file   Occupational History    Not on file   Other Topics Concern    Not on file   Social History Narrative    Not on file        Family Psychiatric History:     Family History[4]    Medical History Reviewed by provider this encounter:  Tobacco  Allergies   Meds  Problems  Med Hx  Surg Hx  Fam Hx          Objective   There were no vitals taken for this visit.     Mental Status Evaluation:    Appearance age appropriate, casually dressed   Behavior cooperative, calm   Speech normal rate, normal volume   Mood euthymic   Affect constricted   Thought Processes organized, goal directed   Thought Content no overt delusions   Perceptual Disturbances: no auditory hallucinations, no visual hallucinations   Abnormal Thoughts  Risk Potential Suicidal ideation - None  Homicidal ideation - None  Potential for aggression - No   Orientation grossly oriented   Memory recent and remote memory grossly intact   Consciousness alert and awake   Attention Span Concentration Span attention span and concentration are age appropriate   Intellect appears to be of average intelligence   Insight intact   Judgement intact   Muscle Strength and  Gait unable to assess today due to virtual visit   Motor activity unable to assess today due to virtual visit   Language no difficulty naming common objects   Fund of Knowledge adequate knowledge of current events       Laboratory Results: I have personally reviewed all pertinent laboratory/tests results        Suicide/Homicide Risk Assessment:    Risk of Harm to Self:  Based on today's assessment, Amy presents the following risk of harm to self: none    Risk of Harm to Others:  Based on today's assessment, Amy presents the following risk of harm to others: none    The following interventions are recommended: Continue medication management.    Psychotherapy Provided:     Individual psychotherapy provided: No    Treatment Plan:    Completed and signed during the session: Not applicable - Treatment Plan not due at this session.    Goals: Progress towards Treatment Plan goals - Yes, progressing, as evidenced by subjective findings in HPI/Subjective Section    Depression Follow-up Plan Completed: Not applicable    Note Share:        Administrative  Statements   Administrative Statements   Encounter provider Tiffanie Castorena MD    The Patient is located at Home and in the following state in which I hold an active license [place].    The patient was identified by name and date of birth. Amy Villatoro was informed that this is a telemedicine visit and that the visit is being conducted through the Epic Embedded platform. She agrees to proceed..  My office door was closed. No one else was in the room.  She acknowledged consent and understanding of privacy and security of the video platform. The patient has agreed to participate and understands they can discontinue the visit at any time.    I have spent a total time of 20 minutes in caring for this patient on the day of the visit/encounter including Risks and benefits of tx options, Instructions for management, Documenting in the medical record, and Reviewing/placing orders in the medical record (including tests, medications, and/or procedures), not including the time spent for establishing the audio/video connection.    Visit Time  Face to face  Visit Start Time: 1.00 pm   Visit Stop Time: 1.11 pm   Total Visit Duration: 20 minutes total spent in patient care        Tiffanie Castorena MD 07/21/25       [1]   Past Medical History:  Diagnosis Date    Anxiety     BMI 28.0-28.9,adult 01/17/2020    Depression     Dysuria     LAST ASSESSED [id number]    Eating disorder     Migraine     LAST ASSESSED [id number]    Miscarriage 06/2019    Ovarian cyst    [2]   Past Surgical History:  Procedure Laterality Date    DILATION AND CURETTAGE OF UTERUS  06/2019   [3] No Known Allergies  [4]   Family History  Problem Relation Name Age of Onset    Irritable bowel syndrome Mother      Iron deficiency Mother          IRON DEFICIENCY ANEMIA LOWEST 5.8     Diabetes Maternal Grandfather Na     Heart disease Maternal Grandfather Na     Depression Maternal Grandmother Na

## 2025-07-21 NOTE — TELEPHONE ENCOUNTER
Writer called client to schedule 3 month follow up with Dr. Castorena. Call dropped first attempted, writer was able to complete call second attempt.